# Patient Record
Sex: MALE | Race: WHITE | NOT HISPANIC OR LATINO
[De-identification: names, ages, dates, MRNs, and addresses within clinical notes are randomized per-mention and may not be internally consistent; named-entity substitution may affect disease eponyms.]

---

## 2018-01-26 ENCOUNTER — RECORD ABSTRACTING (OUTPATIENT)
Age: 48
End: 2018-01-26

## 2018-01-26 DIAGNOSIS — Z78.9 OTHER SPECIFIED HEALTH STATUS: ICD-10-CM

## 2018-01-26 DIAGNOSIS — Z82.49 FAMILY HISTORY OF ISCHEMIC HEART DISEASE AND OTHER DISEASES OF THE CIRCULATORY SYSTEM: ICD-10-CM

## 2018-01-26 PROBLEM — Z00.00 ENCOUNTER FOR PREVENTIVE HEALTH EXAMINATION: Status: ACTIVE | Noted: 2018-01-26

## 2018-02-22 ENCOUNTER — APPOINTMENT (OUTPATIENT)
Dept: CARDIOLOGY | Facility: CLINIC | Age: 48
End: 2018-02-22

## 2018-06-20 ENCOUNTER — RX RENEWAL (OUTPATIENT)
Age: 48
End: 2018-06-20

## 2018-06-20 ENCOUNTER — MEDICATION RENEWAL (OUTPATIENT)
Age: 48
End: 2018-06-20

## 2018-06-21 ENCOUNTER — RECORD ABSTRACTING (OUTPATIENT)
Age: 48
End: 2018-06-21

## 2018-06-21 RX ORDER — AMLODIPINE BESYLATE AND VALSARTAN 5; 160 MG/1; MG/1
5-160 TABLET, FILM COATED ORAL
Refills: 0 | Status: DISCONTINUED | COMMUNITY
End: 2018-06-21

## 2018-07-06 ENCOUNTER — APPOINTMENT (OUTPATIENT)
Dept: CARDIOLOGY | Facility: CLINIC | Age: 48
End: 2018-07-06
Payer: COMMERCIAL

## 2018-07-06 VITALS
RESPIRATION RATE: 16 BRPM | WEIGHT: 220 LBS | HEART RATE: 88 BPM | SYSTOLIC BLOOD PRESSURE: 120 MMHG | BODY MASS INDEX: 29.8 KG/M2 | DIASTOLIC BLOOD PRESSURE: 80 MMHG | HEIGHT: 72 IN

## 2018-07-06 PROCEDURE — 99214 OFFICE O/P EST MOD 30 MIN: CPT

## 2018-07-06 PROCEDURE — 93000 ELECTROCARDIOGRAM COMPLETE: CPT

## 2018-08-06 ENCOUNTER — APPOINTMENT (OUTPATIENT)
Dept: CARDIOLOGY | Facility: CLINIC | Age: 48
End: 2018-08-06
Payer: COMMERCIAL

## 2018-08-06 PROCEDURE — 93306 TTE W/DOPPLER COMPLETE: CPT

## 2018-08-13 ENCOUNTER — APPOINTMENT (OUTPATIENT)
Dept: ELECTROPHYSIOLOGY | Facility: CLINIC | Age: 48
End: 2018-08-13

## 2018-09-10 ENCOUNTER — APPOINTMENT (OUTPATIENT)
Dept: ELECTROPHYSIOLOGY | Facility: CLINIC | Age: 48
End: 2018-09-10

## 2018-09-14 ENCOUNTER — RX RENEWAL (OUTPATIENT)
Age: 48
End: 2018-09-14

## 2018-10-03 ENCOUNTER — APPOINTMENT (OUTPATIENT)
Dept: ELECTROPHYSIOLOGY | Facility: CLINIC | Age: 48
End: 2018-10-03
Payer: COMMERCIAL

## 2018-10-03 VITALS
BODY MASS INDEX: 29.8 KG/M2 | OXYGEN SATURATION: 98 % | HEIGHT: 72 IN | SYSTOLIC BLOOD PRESSURE: 124 MMHG | WEIGHT: 220 LBS | DIASTOLIC BLOOD PRESSURE: 82 MMHG | HEART RATE: 97 BPM

## 2018-10-03 VITALS — DIASTOLIC BLOOD PRESSURE: 79 MMHG | SYSTOLIC BLOOD PRESSURE: 116 MMHG

## 2018-10-03 PROCEDURE — 99245 OFF/OP CONSLTJ NEW/EST HI 55: CPT

## 2018-10-03 PROCEDURE — 93000 ELECTROCARDIOGRAM COMPLETE: CPT

## 2018-10-10 ENCOUNTER — OUTPATIENT (OUTPATIENT)
Dept: OUTPATIENT SERVICES | Facility: HOSPITAL | Age: 48
LOS: 1 days | End: 2018-10-10
Payer: COMMERCIAL

## 2018-10-10 VITALS
RESPIRATION RATE: 18 BRPM | SYSTOLIC BLOOD PRESSURE: 122 MMHG | HEIGHT: 72 IN | DIASTOLIC BLOOD PRESSURE: 92 MMHG | WEIGHT: 220.02 LBS | HEART RATE: 99 BPM | TEMPERATURE: 98 F

## 2018-10-10 VITALS — WEIGHT: 220.02 LBS

## 2018-10-10 DIAGNOSIS — Z98.890 OTHER SPECIFIED POSTPROCEDURAL STATES: Chronic | ICD-10-CM

## 2018-10-10 DIAGNOSIS — I48.1 PERSISTENT ATRIAL FIBRILLATION: ICD-10-CM

## 2018-10-10 DIAGNOSIS — Z01.810 ENCOUNTER FOR PREPROCEDURAL CARDIOVASCULAR EXAMINATION: ICD-10-CM

## 2018-10-10 LAB
ANION GAP SERPL CALC-SCNC: 13 MMOL/L — SIGNIFICANT CHANGE UP (ref 5–17)
APTT BLD: 37.2 SEC — SIGNIFICANT CHANGE UP (ref 27.5–37.4)
BASOPHILS # BLD AUTO: 0 K/UL — SIGNIFICANT CHANGE UP (ref 0–0.2)
BASOPHILS NFR BLD AUTO: 0.4 % — SIGNIFICANT CHANGE UP (ref 0–2)
BUN SERPL-MCNC: 15 MG/DL — SIGNIFICANT CHANGE UP (ref 8–20)
CALCIUM SERPL-MCNC: 9.5 MG/DL — SIGNIFICANT CHANGE UP (ref 8.6–10.2)
CHLORIDE SERPL-SCNC: 104 MMOL/L — SIGNIFICANT CHANGE UP (ref 98–107)
CO2 SERPL-SCNC: 23 MMOL/L — SIGNIFICANT CHANGE UP (ref 22–29)
CREAT SERPL-MCNC: 0.74 MG/DL — SIGNIFICANT CHANGE UP (ref 0.5–1.3)
EOSINOPHIL # BLD AUTO: 0.2 K/UL — SIGNIFICANT CHANGE UP (ref 0–0.5)
EOSINOPHIL NFR BLD AUTO: 3.9 % — SIGNIFICANT CHANGE UP (ref 0–6)
GLUCOSE SERPL-MCNC: 128 MG/DL — HIGH (ref 70–115)
HCT VFR BLD CALC: 43.9 % — SIGNIFICANT CHANGE UP (ref 42–52)
HCV AB S/CO SERPL IA: 0.37 S/CO — SIGNIFICANT CHANGE UP
HCV AB SERPL-IMP: SIGNIFICANT CHANGE UP
HGB BLD-MCNC: 15.3 G/DL — SIGNIFICANT CHANGE UP (ref 14–18)
HIV 1 & 2 AB SERPL IA.RAPID: SIGNIFICANT CHANGE UP
INR BLD: 1.56 RATIO — HIGH (ref 0.88–1.16)
LYMPHOCYTES # BLD AUTO: 0.9 K/UL — LOW (ref 1–4.8)
LYMPHOCYTES # BLD AUTO: 18 % — LOW (ref 20–55)
MAGNESIUM SERPL-MCNC: 2 MG/DL — SIGNIFICANT CHANGE UP (ref 1.6–2.6)
MCHC RBC-ENTMCNC: 31.7 PG — HIGH (ref 27–31)
MCHC RBC-ENTMCNC: 34.9 G/DL — SIGNIFICANT CHANGE UP (ref 32–36)
MCV RBC AUTO: 91.1 FL — SIGNIFICANT CHANGE UP (ref 80–94)
MONOCYTES # BLD AUTO: 0.6 K/UL — SIGNIFICANT CHANGE UP (ref 0–0.8)
MONOCYTES NFR BLD AUTO: 11.7 % — HIGH (ref 3–10)
NEUTROPHILS # BLD AUTO: 3.2 K/UL — SIGNIFICANT CHANGE UP (ref 1.8–8)
NEUTROPHILS NFR BLD AUTO: 65.8 % — SIGNIFICANT CHANGE UP (ref 37–73)
PLATELET # BLD AUTO: 205 K/UL — SIGNIFICANT CHANGE UP (ref 150–400)
POTASSIUM SERPL-MCNC: 4.2 MMOL/L — SIGNIFICANT CHANGE UP (ref 3.5–5.3)
POTASSIUM SERPL-SCNC: 4.2 MMOL/L — SIGNIFICANT CHANGE UP (ref 3.5–5.3)
PROTHROM AB SERPL-ACNC: 17.3 SEC — HIGH (ref 9.8–12.7)
RBC # BLD: 4.82 M/UL — SIGNIFICANT CHANGE UP (ref 4.6–6.2)
RBC # FLD: 12.4 % — SIGNIFICANT CHANGE UP (ref 11–15.6)
SODIUM SERPL-SCNC: 140 MMOL/L — SIGNIFICANT CHANGE UP (ref 135–145)
WBC # BLD: 4.9 K/UL — SIGNIFICANT CHANGE UP (ref 4.8–10.8)
WBC # FLD AUTO: 4.9 K/UL — SIGNIFICANT CHANGE UP (ref 4.8–10.8)

## 2018-10-10 PROCEDURE — 85610 PROTHROMBIN TIME: CPT

## 2018-10-10 PROCEDURE — 83735 ASSAY OF MAGNESIUM: CPT

## 2018-10-10 PROCEDURE — G0463: CPT

## 2018-10-10 PROCEDURE — 36415 COLL VENOUS BLD VENIPUNCTURE: CPT

## 2018-10-10 PROCEDURE — 86803 HEPATITIS C AB TEST: CPT

## 2018-10-10 PROCEDURE — 86703 HIV-1/HIV-2 1 RESULT ANTBDY: CPT

## 2018-10-10 PROCEDURE — 85730 THROMBOPLASTIN TIME PARTIAL: CPT

## 2018-10-10 PROCEDURE — 85027 COMPLETE CBC AUTOMATED: CPT

## 2018-10-10 PROCEDURE — 80048 BASIC METABOLIC PNL TOTAL CA: CPT

## 2018-10-10 PROCEDURE — 93010 ELECTROCARDIOGRAM REPORT: CPT

## 2018-10-10 PROCEDURE — 93005 ELECTROCARDIOGRAM TRACING: CPT

## 2018-10-10 NOTE — H&P PST ADULT - MUSCULOSKELETAL
detailed exam no calf tenderness/no joint erythema/no joint warmth/no joint swelling/normal/ROM intact

## 2018-10-10 NOTE — H&P PST ADULT - PMH
Atrial fibrillation, persistent    Cardiomyopathy  Ef 45%  ETOH abuse  stopped daily drinking of  5-6  drinks of rum and coke  april 2018,  now  only  drinks  wine  1  glass  4  times weekly as per pt, denies  history of DTs  Hyperlipidemia    Hypertension    LV dysfunction    Systolic left-sided congestive heart failure, NYHA class 1 Alcohol use  excessive  Atrial fibrillation, persistent    Cardiomyopathy  Ef 45%  EDMOND (dyspnea on exertion)    ETOH abuse  stopped daily drinking of  5-6  drinks of rum and coke  april 2018,  now  only  drinks  wine  1  glass  4  times weekly as per pt, denies  history of DTs  Hyperlipidemia    Hypertension    LV dysfunction    Palpitations    Systolic left-sided congestive heart failure, NYHA class 1 Alcohol use  excessive  Atrial fibrillation, persistent  CHADsVASC 2  Cardiomyopathy  Ef 45%  Dizziness    EDMOND (dyspnea on exertion)    ETOH abuse  stopped daily drinking of  5-6  drinks of rum and coke  april 2018,  now  only  drinks  wine  1  glass  4  times weekly as per pt, denies  history of DTs  Fatigue    History of cardioversion  2017 x3 months of SR  Hyperlipidemia    Hypertension    LV dysfunction    Palpitations    Systolic left-sided congestive heart failure, NYHA class 1

## 2018-10-10 NOTE — H&P PST ADULT - ASSESSMENT
KYLIE/CV for persistent and symptomatic AFib  Compliant with his medications  Pt to continue DOAC for KYLIE/CV  Escort for discharge  NPO after midnight

## 2018-10-10 NOTE — H&P PST ADULT - NEUROLOGICAL DETAILS
responds to verbal commands/deep reflexes intact/responds to pain/alert and oriented x 3/sensation intact

## 2018-10-10 NOTE — ASU PATIENT PROFILE, ADULT - PSH
S/P hernia repair  umbilical- H/O prior ablation treatment  cardiac ablation  for  af  april 2018  ssh   lasted  for  3  months  as per pt  S/P hernia repair  umbilical-

## 2018-10-10 NOTE — H&P PST ADULT - HISTORY OF PRESENT ILLNESS
56 y/o white male appears jittery during PST for KYLIE/CV for persistent and symptomatic atrial fibrillation which is impacting his quality of life as a  with SOB/fatigue  States compliance with medications ie; xarelto, BB and CCB/ARB    PMH: Prior CV in 2017 with recurrence in 3 months, NICM w/EF 45% ETOH likely induced CHADsVAsC score is 2 58 y/o white male appears jittery during PST for KYLIE/CV for persistent and symptomatic atrial fibrillation which is impacting his quality of life as a  with SOB/fatigue  States compliance with medications ie; xarelto, BB and CCB/ARB.  Patient is not receptive to antiarrythmic therapy at this time.    PMH: Prior CV in 2017 with recurrence in 3 months, NICM w/EF 45% ETOH likely induced CHADsVAsC score is 2

## 2018-10-10 NOTE — ASU PATIENT PROFILE, ADULT - PMH
Atrial fibrillation, persistent    Cardiomyopathy  Ef 30-30%  ETOH abuse    Hyperlipidemia    Hypertension    LV dysfunction Atrial fibrillation, persistent    Cardiomyopathy  Ef 30-30%  ETOH abuse  stopped daily drinking of  5-6  drinks of rum and coke  april 2018,  now  only  drinks  wine  1  glass  4  times weekly as per pt, denies  history of DTs  Hyperlipidemia    Hypertension    LV dysfunction

## 2018-10-11 PROBLEM — F10.10 ALCOHOL ABUSE, UNCOMPLICATED: Chronic | Status: ACTIVE | Noted: 2018-10-10

## 2018-10-14 ENCOUNTER — FORM ENCOUNTER (OUTPATIENT)
Age: 48
End: 2018-10-14

## 2018-10-15 ENCOUNTER — TRANSCRIPTION ENCOUNTER (OUTPATIENT)
Age: 48
End: 2018-10-15

## 2018-10-15 ENCOUNTER — OUTPATIENT (OUTPATIENT)
Dept: OUTPATIENT SERVICES | Facility: HOSPITAL | Age: 48
LOS: 1 days | End: 2018-10-15
Payer: COMMERCIAL

## 2018-10-15 VITALS — WEIGHT: 220.02 LBS

## 2018-10-15 DIAGNOSIS — I48.0 PAROXYSMAL ATRIAL FIBRILLATION: ICD-10-CM

## 2018-10-15 DIAGNOSIS — Z98.890 OTHER SPECIFIED POSTPROCEDURAL STATES: Chronic | ICD-10-CM

## 2018-10-15 DIAGNOSIS — Z01.810 ENCOUNTER FOR PREPROCEDURAL CARDIOVASCULAR EXAMINATION: ICD-10-CM

## 2018-10-15 PROCEDURE — 93005 ELECTROCARDIOGRAM TRACING: CPT

## 2018-10-15 PROCEDURE — 93312 ECHO TRANSESOPHAGEAL: CPT

## 2018-10-15 PROCEDURE — 93320 DOPPLER ECHO COMPLETE: CPT | Mod: 26

## 2018-10-15 PROCEDURE — 93312 ECHO TRANSESOPHAGEAL: CPT | Mod: 26

## 2018-10-15 PROCEDURE — 92960 CARDIOVERSION ELECTRIC EXT: CPT

## 2018-10-15 PROCEDURE — 93325 DOPPLER ECHO COLOR FLOW MAPG: CPT

## 2018-10-15 PROCEDURE — 93010 ELECTROCARDIOGRAM REPORT: CPT

## 2018-10-15 PROCEDURE — 93320 DOPPLER ECHO COMPLETE: CPT

## 2018-10-15 PROCEDURE — 93325 DOPPLER ECHO COLOR FLOW MAPG: CPT | Mod: 26

## 2018-10-15 PROCEDURE — 90686 IIV4 VACC NO PRSV 0.5 ML IM: CPT

## 2018-10-15 RX ORDER — SODIUM CHLORIDE 9 MG/ML
1000 INJECTION INTRAMUSCULAR; INTRAVENOUS; SUBCUTANEOUS
Qty: 0 | Refills: 0 | Status: DISCONTINUED | OUTPATIENT
Start: 2018-10-15 | End: 2018-10-30

## 2018-10-15 RX ORDER — INFLUENZA VIRUS VACCINE 15; 15; 15; 15 UG/.5ML; UG/.5ML; UG/.5ML; UG/.5ML
0.5 SUSPENSION INTRAMUSCULAR ONCE
Qty: 0 | Refills: 0 | Status: COMPLETED | OUTPATIENT
Start: 2018-10-15 | End: 2018-10-15

## 2018-10-15 RX ADMIN — SODIUM CHLORIDE 30 MILLILITER(S): 9 INJECTION INTRAMUSCULAR; INTRAVENOUS; SUBCUTANEOUS at 08:55

## 2018-10-15 RX ADMIN — INFLUENZA VIRUS VACCINE 0.5 MILLILITER(S): 15; 15; 15; 15 SUSPENSION INTRAMUSCULAR at 11:15

## 2018-10-15 NOTE — PROGRESS NOTE ADULT - SUBJECTIVE AND OBJECTIVE BOX
NPO>8 hours  IVF ordered  Took Xarelto at home today  Dr. Arreola to consent NPO>8 hours  IVF ordered  Took Xarelto at home today  AFib on tele  Dr. Arreola to consent

## 2018-10-15 NOTE — DISCHARGE NOTE ADULT - NS AS ACTIVITY OBS
Walking-Outdoors allowed/Do not make important decisions/Walking-Indoors allowed/Bathing allowed/Showering allowed/Do not drive or operate machinery

## 2018-10-15 NOTE — DISCHARGE NOTE ADULT - CARE PLAN
Principal Discharge DX:	Atrial fibrillation, persistent  Goal:	ADL without palpitations  Assessment and plan of treatment:	Continue xarelto and other meds  See cardiology 7-10 days as outpatient

## 2018-10-15 NOTE — DISCHARGE NOTE ADULT - CARE PROVIDER_API CALL
Rhett Arreola), Cardiovascular Disease; Internal Medicine  [address] [place] [address]  Phone: [phone]  Fax: [phone]

## 2018-10-15 NOTE — PROCEDURE NOTE - ADDITIONAL PROCEDURE DETAILS
Pre-procedural KYLIE performed confirming absence of LA/ROCIO thrombus. Patient also on full A/C with Xarelto taken daily and morning of procedure.

## 2018-10-15 NOTE — PROGRESS NOTE ADULT - SUBJECTIVE AND OBJECTIVE BOX
FIDELINA LOWRY  [id number]    After risks, benefits and alternatives of the procedure were explained, consent was signded and placed in the medical record.  Procedural timeout was taken.  Sedation was administered by anesthesia.  KYLIE probe inserted without complication and KYLIE performed.   Patient tolerated the procedure well without complication.  See full report for findings.     1. Moderate LV dysfunction  2. Moderate LAE  3. Mild MR  4. NO evidence LA/ROCIO thrombus  5. Intact IAS    Plan:  1: After KYLIE patient successfully cardioverted to SR (see note)  2. DC home today  3. Outpatient follow up with Sydnee Doyle and Logan, consider antiarrhythmic therapy if patient amenable      Rhett Arreola MD

## 2018-10-15 NOTE — DISCHARGE NOTE ADULT - PATIENT PORTAL LINK FT
You can access the [facility] Patient Portal, offered by [facility], by registering with the following website: [email or web address]

## 2018-10-15 NOTE — DISCHARGE NOTE ADULT - MEDICATION SUMMARY - MEDICATIONS TO TAKE
I will START or STAY ON the medications listed below when I get home from the hospital:    Xarelto 20 mg oral tablet  -- 1 tab(s) by mouth once a day (in the evening)  -- Indication: For Paroxysmal atrial fibrillation    amLODIPine-valsartan 5 mg-160 mg oral tablet  -- 1 tab(s) by mouth once a day  -- Indication: For htn    Metoprolol Succinate  mg oral tablet, extended release  -- 1 tab(s) by mouth once a day  -- Indication: For Paroxysmal atrial fibrillation

## 2018-10-15 NOTE — DISCHARGE NOTE ADULT - HOSPITAL COURSE
58 y/o white male appears jittery during PST for KYLIE/CV for persistent and symptomatic atrial fibrillation which is impacting his quality of life as a  with SOB/fatigue  States compliance with medications ie; xarelto, BB and CCB/ARB.  Patient is not receptive to antiarrythmic therapy at this time.    PMH: Prior CV in 2017 with recurrence in 3 months, NICM w/EF 45% ETOH likely induced CHADsVAsC score is 2  S/P KYLIE no clott  DCCV 200 joules 1st attempt  Continue xarelto as ordered.

## 2018-11-02 ENCOUNTER — APPOINTMENT (OUTPATIENT)
Dept: CARDIOLOGY | Facility: CLINIC | Age: 48
End: 2018-11-02
Payer: COMMERCIAL

## 2018-11-02 VITALS
HEART RATE: 61 BPM | HEIGHT: 72 IN | WEIGHT: 218 LBS | SYSTOLIC BLOOD PRESSURE: 140 MMHG | RESPIRATION RATE: 15 BRPM | DIASTOLIC BLOOD PRESSURE: 90 MMHG | BODY MASS INDEX: 29.53 KG/M2

## 2018-11-02 PROBLEM — I50.20 UNSPECIFIED SYSTOLIC (CONGESTIVE) HEART FAILURE: Chronic | Status: ACTIVE | Noted: 2018-10-10

## 2018-11-02 PROBLEM — R06.09 OTHER FORMS OF DYSPNEA: Chronic | Status: ACTIVE | Noted: 2018-10-10

## 2018-11-02 PROBLEM — I51.9 HEART DISEASE, UNSPECIFIED: Chronic | Status: ACTIVE | Noted: 2018-10-10

## 2018-11-02 PROBLEM — R53.83 OTHER FATIGUE: Chronic | Status: ACTIVE | Noted: 2018-10-10

## 2018-11-02 PROBLEM — R42 DIZZINESS AND GIDDINESS: Chronic | Status: ACTIVE | Noted: 2018-10-10

## 2018-11-02 PROBLEM — R00.2 PALPITATIONS: Chronic | Status: ACTIVE | Noted: 2018-10-10

## 2018-11-02 PROBLEM — Z78.9 OTHER SPECIFIED HEALTH STATUS: Chronic | Status: ACTIVE | Noted: 2018-10-10

## 2018-11-02 PROCEDURE — 93000 ELECTROCARDIOGRAM COMPLETE: CPT

## 2018-11-02 PROCEDURE — 99214 OFFICE O/P EST MOD 30 MIN: CPT

## 2018-11-16 ENCOUNTER — RX RENEWAL (OUTPATIENT)
Age: 48
End: 2018-11-16

## 2019-01-23 ENCOUNTER — APPOINTMENT (OUTPATIENT)
Dept: CARDIOLOGY | Facility: CLINIC | Age: 49
End: 2019-01-23
Payer: COMMERCIAL

## 2019-01-23 PROCEDURE — 93306 TTE W/DOPPLER COMPLETE: CPT

## 2019-02-08 ENCOUNTER — NON-APPOINTMENT (OUTPATIENT)
Age: 49
End: 2019-02-08

## 2019-02-08 ENCOUNTER — APPOINTMENT (OUTPATIENT)
Dept: CARDIOLOGY | Facility: CLINIC | Age: 49
End: 2019-02-08
Payer: COMMERCIAL

## 2019-02-08 VITALS
SYSTOLIC BLOOD PRESSURE: 140 MMHG | DIASTOLIC BLOOD PRESSURE: 90 MMHG | BODY MASS INDEX: 29.8 KG/M2 | HEIGHT: 72 IN | HEART RATE: 56 BPM | RESPIRATION RATE: 14 BRPM | WEIGHT: 220 LBS

## 2019-02-08 PROCEDURE — 93000 ELECTROCARDIOGRAM COMPLETE: CPT

## 2019-02-08 PROCEDURE — 99214 OFFICE O/P EST MOD 30 MIN: CPT

## 2019-02-08 NOTE — REASON FOR VISIT
[Follow-Up - Clinic] : a clinic follow-up of [FreeTextEntry1] : The patient is a 48-year-old white male with a known history of idiopathic cardiomyopathy (possibly alcohol-related) who also has a history of paroxysmal atrial  fibrillation;\par \par He presents back to the office today for general cardiac checkup;\par \par Fortunately, he has been feeling well without any significant symptoms of chest pain, shortness of breath, palpitations, dizziness or syncope;

## 2019-02-08 NOTE — HISTORY OF PRESENT ILLNESS
[FreeTextEntry1] : He is working full time in his plumbing business and remains physically active.;\par \par He reports that he has cut down considerably on alcohol etc.;\par \par He had a recent echocardiogram  and is here to discus those results;

## 2019-02-08 NOTE — PHYSICAL EXAM
[General Appearance - Well Developed] : well developed [Normal Appearance] : normal appearance [Well Groomed] : well groomed [General Appearance - Well Nourished] : well nourished [No Deformities] : no deformities [Normal Conjunctiva] : the conjunctiva exhibited no abnormalities [Eyelids - No Xanthelasma] : the eyelids demonstrated no xanthelasmas [Normal Oral Mucosa] : normal oral mucosa [No Oral Pallor] : no oral pallor [No Oral Cyanosis] : no oral cyanosis [Normal Jugular Venous A Waves Present] : normal jugular venous A waves present [Normal Jugular Venous V Waves Present] : normal jugular venous V waves present [No Jugular Venous Crump A Waves] : no jugular venous crump A waves [Respiration, Rhythm And Depth] : normal respiratory rhythm and effort [Exaggerated Use Of Accessory Muscles For Inspiration] : no accessory muscle use [Auscultation Breath Sounds / Voice Sounds] : lungs were clear to auscultation bilaterally [Heart Sounds] : normal S1 and S2 [Murmurs] : no murmurs present [FreeTextEntry1] : regular rhythm with moderate rate, normal S1-S2; [Abdomen Soft] : soft [Abdomen Tenderness] : non-tender [] : no hepato-splenomegaly [Abdomen Mass (___ Cm)] : no abdominal mass palpated

## 2019-02-08 NOTE — ASSESSMENT
[FreeTextEntry1] : EKG shows sinus bradycardia at a rate of 56 with poor R-wave progression V1 to V2 and otherwise no acute changes;\par \par in summary, the patient is a 48-year-old  history of paroxysmal AF, and idiopathic cardiomyopathy disease possibly EtOH related  who presents back  for general cardiac checkup today;\par \par \par Transthoracic echocardiogram still showing left ventricular enlargement with mild to moderate LV dysfunction (LVEF of 40-45% range with normal LA size;\par \par Plan:\par \par Patient recommended to continue current multiple medical regimens including anticoagulation with Xarelto;\par \par Patient had conferred with Dr. Doyle from the EP standpoint in the past;\par \par Followup to this office within 5 months or p.r.n.\par \par No additional cardiac workup indicated at this time;

## 2019-06-07 ENCOUNTER — APPOINTMENT (OUTPATIENT)
Dept: CARDIOLOGY | Facility: CLINIC | Age: 49
End: 2019-06-07
Payer: COMMERCIAL

## 2019-06-07 ENCOUNTER — NON-APPOINTMENT (OUTPATIENT)
Age: 49
End: 2019-06-07

## 2019-06-07 VITALS
BODY MASS INDEX: 30.75 KG/M2 | WEIGHT: 232 LBS | DIASTOLIC BLOOD PRESSURE: 82 MMHG | RESPIRATION RATE: 14 BRPM | HEART RATE: 65 BPM | SYSTOLIC BLOOD PRESSURE: 127 MMHG | HEIGHT: 73 IN

## 2019-06-07 PROCEDURE — 99214 OFFICE O/P EST MOD 30 MIN: CPT

## 2019-06-07 PROCEDURE — 93000 ELECTROCARDIOGRAM COMPLETE: CPT

## 2019-06-07 NOTE — REASON FOR VISIT
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

## 2019-06-07 NOTE — PHYSICAL EXAM
[General Appearance - Well Developed] : well developed [Normal Appearance] : normal appearance [Well Groomed] : well groomed [General Appearance - Well Nourished] : well nourished [No Deformities] : no deformities [Normal Conjunctiva] : the conjunctiva exhibited no abnormalities [Normal Oral Mucosa] : normal oral mucosa [Eyelids - No Xanthelasma] : the eyelids demonstrated no xanthelasmas [No Oral Pallor] : no oral pallor [No Oral Cyanosis] : no oral cyanosis [Normal Jugular Venous A Waves Present] : normal jugular venous A waves present [Normal Jugular Venous V Waves Present] : normal jugular venous V waves present [No Jugular Venous Crump A Waves] : no jugular venous crump A waves [Respiration, Rhythm And Depth] : normal respiratory rhythm and effort [Exaggerated Use Of Accessory Muscles For Inspiration] : no accessory muscle use [Auscultation Breath Sounds / Voice Sounds] : lungs were clear to auscultation bilaterally [Murmurs] : no murmurs present [Heart Sounds] : normal S1 and S2 [Abdomen Soft] : soft [Abdomen Tenderness] : non-tender [Abdomen Mass (___ Cm)] : no abdominal mass palpated [Abnormal Walk] : normal gait [Nail Clubbing] : no clubbing of the fingernails [Gait - Sufficient For Exercise Testing] : the gait was sufficient for exercise testing [Cyanosis, Localized] : no localized cyanosis [Petechial Hemorrhages (___cm)] : no petechial hemorrhages [FreeTextEntry1] : Well tanned sunburn [] : no ischemic changes [Affect] : the affect was normal [Oriented To Time, Place, And Person] : oriented to person, place, and time [Mood] : the mood was normal [No Anxiety] : not feeling anxious

## 2019-06-07 NOTE — ASSESSMENT
[FreeTextEntry1] : EKG shows normal sinus rhythm at a rate of 62 with some late R wave transition V1 to V3 and otherwise no acute changes\par \par Last transthoracic echo showed mild LV enlargement with mild LV dysfunction with an EF in the range of 40-45%;\par \par In summary the patient is a 48-year-old gentleman with a history of cardiomyopathy with mild LV dysfunction which seems to have somewhat improved on multiple medical regimens and cutting back on alcohol as requested in the past.;\par \par Plan:\par \par Recommend continue current medical regimen, patient to report any untoward shortness of breath lightheadedness or dizziness;\par \par Again reminded to curtail EtOH use this summer, maintaining good p.o. hydration, and continue present medical regimen;\par \par Followup in this office within 4 months or p.r.n.;\par \par No additional cardiac workup indicated at this time;

## 2019-06-07 NOTE — HISTORY OF PRESENT ILLNESS
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

## 2019-07-12 ENCOUNTER — RX RENEWAL (OUTPATIENT)
Age: 49
End: 2019-07-12

## 2019-09-06 ENCOUNTER — RX RENEWAL (OUTPATIENT)
Age: 49
End: 2019-09-06

## 2019-11-05 ENCOUNTER — RX RENEWAL (OUTPATIENT)
Age: 49
End: 2019-11-05

## 2020-01-21 ENCOUNTER — NON-APPOINTMENT (OUTPATIENT)
Age: 50
End: 2020-01-21

## 2020-01-21 ENCOUNTER — APPOINTMENT (OUTPATIENT)
Dept: CARDIOLOGY | Facility: CLINIC | Age: 50
End: 2020-01-21
Payer: COMMERCIAL

## 2020-01-21 VITALS
DIASTOLIC BLOOD PRESSURE: 90 MMHG | SYSTOLIC BLOOD PRESSURE: 140 MMHG | WEIGHT: 236 LBS | OXYGEN SATURATION: 98 % | HEART RATE: 93 BPM | HEIGHT: 73 IN | BODY MASS INDEX: 31.28 KG/M2 | RESPIRATION RATE: 16 BRPM

## 2020-01-21 PROCEDURE — 99215 OFFICE O/P EST HI 40 MIN: CPT

## 2020-01-21 PROCEDURE — 93000 ELECTROCARDIOGRAM COMPLETE: CPT

## 2020-01-21 NOTE — PHYSICAL EXAM
[Normal Appearance] : normal appearance [General Appearance - In No Acute Distress] : no acute distress [FreeTextEntry1] : Obese [Normal Conjunctiva] : the conjunctiva exhibited no abnormalities [Normal Oral Mucosa] : normal oral mucosa [Normal Oropharynx] : normal oropharynx [Normal Jugular Venous A Waves Present] : normal jugular venous A waves present [Normal Jugular Venous V Waves Present] : normal jugular venous V waves present [No Jugular Venous Crump A Waves] : no jugular venous crump A waves [] : no respiratory distress [Auscultation Breath Sounds / Voice Sounds] : lungs were clear to auscultation bilaterally [Respiration, Rhythm And Depth] : normal respiratory rhythm and effort [Heart Rate And Rhythm] : heart rate and rhythm were normal [Murmurs] : no murmurs present [Heart Sounds] : normal S1 and S2 [Bowel Sounds] : normal bowel sounds [Edema] : no peripheral edema present [Abnormal Walk] : normal gait [Cyanosis, Localized] : no localized cyanosis [Nail Clubbing] : no clubbing of the fingernails [Skin Color & Pigmentation] : normal skin color and pigmentation [Skin Turgor] : normal skin turgor [Impaired Insight] : insight and judgment were intact [Oriented To Time, Place, And Person] : oriented to person, place, and time [Affect] : the affect was normal

## 2020-01-21 NOTE — ASSESSMENT
[FreeTextEntry1] : EKG 1/21/2020:  The EKG illustrates atrial fibrillation with elevated rates in 90's, poor R wave progression V1 to V2. \par \par Most recent Exercise Gated Nuclear Stress test (9/12/2017):  Patient exercised 10 METS and developed shortness of breath that resolved with rest.  The patient developed frequent PVC's and occasional PACs during exercise.  The myocardial perfusion imaging was negative for ischemia with artifact.  The LV function was preserved with an EF of 54%.

## 2020-01-21 NOTE — DISCUSSION/SUMMARY
[FreeTextEntry1] : 1).  Patient is currently in atrial fibrillation with elevated rates in the 90's despite compliance with taking his beta blocker daily;  He is to take an additional 1/2 tablet of Metoprolol tonight (100 mg) for total of (300 mg) and return to his usual dosage of 200 mg QD.\par \par He will complete a 24 Hour Holter monitor to assess for a-fib burden with any associated tachy / avery arrhythmias, pauses and/or AV blocks.\par \par 2).  Patient is tolerating cardiac medications without negative side effects, continue with current cardiac medication regimen including beta blocker therapy as directed and anticoagulation for thromboembolic prophylaxis. \par \par 3).  Patient is to follow up with Electrophysiologist (Dr. Sam / Dr. Case) regarding evaluation and management of a-fib;  discuss the need for possible ILR loop recorder as well as discuss therapies such as medication vs. cardioversion vs. ablation.\par \par 4).  He is to complete a Transthoracic Echocardiogram in the near future to assess cardiac function, cardiomyopathy and valvulopathy.\par \par 5).  Counseled patient on the importance of decreasing ETOH and caffeine intake in hopes of minimizing a-fib with RVR.  He is to maintain optimal PO hydration.\par \par 6).  Immediately go to the emergency department and/or report any untoward symptoms to our office including worsening palpitations, shortness of breath, dizziness, chest pain.\par \par 7).  Follow up with our office in 4 to 6 weeks or PRN.

## 2020-01-21 NOTE — REASON FOR VISIT
[FreeTextEntry1] : The patient is a 49-year-old gentleman with known history of idiopathic cardiomyopathy (possibly alcohol-related) was a history of LV dysfunction and paroxysmal atrial fibrillation as well.  Mr. Burns reports experiencing intermittent episodes of palpitations approximately every two weeks, lasting only seconds, resolve spontaneously.  He denies any associated symptoms of SOB, presyncope, syncope, CP, PND, orthopnea, edema.\par \par

## 2020-01-21 NOTE — HISTORY OF PRESENT ILLNESS
[FreeTextEntry1] : He admits to still drinking ETOH occasionally and also consumes caffeinated beverages daily.  He apparently does not keep well hydrated either.\par \par Patient is tolerating cardiac medications without negative side effects including Metoprolol Succinate 200 mg QD, Xarelto 20 mg QD, Amlodipine-Valsartan 5-160 mg QD.\par \par Last transthoracic echo (January 2019) showed mild LV enlargement with mild LV dysfunction with an EF in the range of 40-45%.

## 2020-02-06 ENCOUNTER — APPOINTMENT (OUTPATIENT)
Dept: CARDIOLOGY | Facility: CLINIC | Age: 50
End: 2020-02-06

## 2020-02-28 ENCOUNTER — APPOINTMENT (OUTPATIENT)
Dept: CARDIOLOGY | Facility: CLINIC | Age: 50
End: 2020-02-28

## 2020-03-02 ENCOUNTER — APPOINTMENT (OUTPATIENT)
Dept: CARDIOLOGY | Facility: CLINIC | Age: 50
End: 2020-03-02
Payer: COMMERCIAL

## 2020-03-02 PROCEDURE — 93306 TTE W/DOPPLER COMPLETE: CPT

## 2020-03-06 ENCOUNTER — APPOINTMENT (OUTPATIENT)
Dept: CARDIOLOGY | Facility: CLINIC | Age: 50
End: 2020-03-06
Payer: COMMERCIAL

## 2020-03-06 ENCOUNTER — NON-APPOINTMENT (OUTPATIENT)
Age: 50
End: 2020-03-06

## 2020-03-06 VITALS
DIASTOLIC BLOOD PRESSURE: 86 MMHG | WEIGHT: 235 LBS | BODY MASS INDEX: 31.83 KG/M2 | HEART RATE: 114 BPM | SYSTOLIC BLOOD PRESSURE: 120 MMHG | HEIGHT: 72 IN | RESPIRATION RATE: 16 BRPM

## 2020-03-06 PROCEDURE — 93000 ELECTROCARDIOGRAM COMPLETE: CPT

## 2020-03-06 PROCEDURE — 99214 OFFICE O/P EST MOD 30 MIN: CPT

## 2020-03-06 NOTE — DISCUSSION/SUMMARY
[FreeTextEntry1] : 1).  A-Fib burden and ventricular rate elevated on recent holter monitor;  will augment patient's beta blocker by increasing to Metoprolol Succinate 300 mg daily.  \par \par All other previously prescribed cardiac medications will be continued including anticoagulation for thromboembolic prophylaxis for a-fib. \par \par 2).  He is to follow up with EP consult with (Dr. Case) on March 26th regarding evaluation and management of a-fib;  discuss need for repeat cardioversion vs. ablation vs. medication.\par \par 3).  Once again counseled patient on the importance of decreasing ETOH and caffeine intake in hopes of minimizing a-fib with RVR. He is to maintain optimal PO hydration.\par \par 4).  Recommend patient report any untoward symptoms. \par \par 5).  Follow up with our office in 3 to 4 months or PRN.

## 2020-03-06 NOTE — REASON FOR VISIT
[FreeTextEntry1] : The patient is a 49-year-old gentleman with known history of idiopathic cardiomyopathy (possibly alcohol-related) was a history of LV dysfunction and paroxysmal atrial fibrillation as well.  \par \par Mr. Burns is also here to review the most recent results of 24 Hour Holter monitor and Transthoracic Echocardiogram.  He reports the previously experienced episodes of palpitations have since decreased in frequency although still present periodically. \par \par He denies any associated symptoms of SOB, presyncope, syncope, CP, PND, orthopnea, edema.\par \par

## 2020-03-06 NOTE — PHYSICAL EXAM
[Normal Appearance] : normal appearance [General Appearance - In No Acute Distress] : no acute distress [Normal Conjunctiva] : the conjunctiva exhibited no abnormalities [Normal Oral Mucosa] : normal oral mucosa [Normal Oropharynx] : normal oropharynx [Normal Jugular Venous A Waves Present] : normal jugular venous A waves present [Normal Jugular Venous V Waves Present] : normal jugular venous V waves present [No Jugular Venous Crump A Waves] : no jugular venous crump A waves [] : no respiratory distress [Respiration, Rhythm And Depth] : normal respiratory rhythm and effort [Auscultation Breath Sounds / Voice Sounds] : lungs were clear to auscultation bilaterally [Heart Rate And Rhythm] : heart rate and rhythm were normal [Heart Sounds] : normal S1 and S2 [Murmurs] : no murmurs present [Edema] : no peripheral edema present [Bowel Sounds] : normal bowel sounds [Abnormal Walk] : normal gait [Nail Clubbing] : no clubbing of the fingernails [Cyanosis, Localized] : no localized cyanosis [Skin Color & Pigmentation] : normal skin color and pigmentation [Skin Turgor] : normal skin turgor [Oriented To Time, Place, And Person] : oriented to person, place, and time [Impaired Insight] : insight and judgment were intact [Affect] : the affect was normal [FreeTextEntry1] : Obese

## 2020-03-06 NOTE — ASSESSMENT
[FreeTextEntry1] : EKG 3/6/2020:  The EKG illustrates atrial fibrillation with elevated rate at 114 bpm,  poor R wave progression V1 to V3. \par \par Most recent Exercise Gated Nuclear Stress test (9/12/2017): Patient exercised 10 METS and developed shortness of breath that resolved with rest. The patient developed frequent PVC's and occasional PACs during exercise. The myocardial perfusion imaging was negative for ischemia with artifact. The LV function was preserved with an EF of 54%.

## 2020-03-06 NOTE — HISTORY OF PRESENT ILLNESS
[FreeTextEntry1] : He admits to still drinking ETOH occasionally and also consumes caffeinated beverages daily. He has increased his water intake and keeping better PO hydrated.\par \par Patient is tolerating cardiac medications without negative side effects including Metoprolol Succinate 200 mg QD, Xarelto 20 mg QD, Amlodipine-Valsartan 5-160 mg QD.\par \par 24 Hour Holter Monitor (1/21/2020):  Presenting rhythm is atrial fibrillation with average ventricular rate at 93 bpm (Max 176, Min 62).  There were NO episodes of VT, pauses or blocks.  There were 14 multifocal PVCs recorded.  There was ONE patient event recorded correlating with atrial fibrillation.\par \par Transthoracic Echocardiogram (3/2/2020):  LV function unchanged from previous study with an LVEF of 40 to 45%.  Mild to moderately increased left ventricular internal cavity size (unchanged from previous study).  The left and right atria normal in size and structure.  Trace MR.

## 2020-03-26 ENCOUNTER — APPOINTMENT (OUTPATIENT)
Dept: ELECTROPHYSIOLOGY | Facility: CLINIC | Age: 50
End: 2020-03-26

## 2020-04-13 ENCOUNTER — RX RENEWAL (OUTPATIENT)
Age: 50
End: 2020-04-13

## 2020-08-06 ENCOUNTER — APPOINTMENT (OUTPATIENT)
Dept: ELECTROPHYSIOLOGY | Facility: CLINIC | Age: 50
End: 2020-08-06
Payer: COMMERCIAL

## 2020-08-06 ENCOUNTER — NON-APPOINTMENT (OUTPATIENT)
Age: 50
End: 2020-08-06

## 2020-08-06 VITALS
RESPIRATION RATE: 16 BRPM | BODY MASS INDEX: 33.18 KG/M2 | DIASTOLIC BLOOD PRESSURE: 80 MMHG | WEIGHT: 245 LBS | SYSTOLIC BLOOD PRESSURE: 128 MMHG | TEMPERATURE: 97.8 F | HEART RATE: 84 BPM | HEIGHT: 72 IN

## 2020-08-06 DIAGNOSIS — I48.0 PAROXYSMAL ATRIAL FIBRILLATION: ICD-10-CM

## 2020-08-06 PROCEDURE — 99245 OFF/OP CONSLTJ NEW/EST HI 55: CPT | Mod: 25

## 2020-08-06 PROCEDURE — 93000 ELECTROCARDIOGRAM COMPLETE: CPT | Mod: 59

## 2020-08-06 NOTE — HISTORY OF PRESENT ILLNESS
[FreeTextEntry1] : FIDELINA LOWRY is a 49 year old male with non-ischemic cardiomyopathy and HFmrEF (LVEF: 40-45%) and hypertension who is referred in consultation by Dr. Ford for persistent atrial fibrillation.\par \par To summarize his history, in 2017 the patient presented to his PCP with complaints of dyspnea on exertion and not feeling right. ECG demonstrated AF. He was referred to Dr. Ford. Exercise stress test was performed which was negative for ischemia. He had a cardioversion in 2017 but had recurrence of AF 3 months later. He saw Dr. Doyle in 10/2018 for persistent AF. His EF was noted to be 45% around that time. He underwent repeat KYLIE/CV with Dr. Doyle in 10/2018 and on KYLIE he was noted to have a low EF of 35%. His low EF was suspected to be secondary to EtOH use. Since then he has cut down on EtOH use but still drinks occasionally. He maintained NSR until 1/2020 at which time during follow up he was noted to be in AF.\par \par When in atrial fibrillation he reports having dyspnea on exertion and not having the same exercise tolerance. He denies any fevers, chills, cough, sweats, chest pain, palpitations, orthopnea, paroxysmal nocturnal dyspnea, peripheral edema, lightheadedness, dizziness, syncope, nausea, vomiting, melena, hematochezia, or hematemesis.

## 2020-08-06 NOTE — REASON FOR VISIT
[Consultation] : a consultation regarding [Atrial Fibrillation] : atrial fibrillation [Cardiomyopathy] : cardiomyopathy [FreeTextEntry1] : Cardiologist: Dr. Ford

## 2020-08-06 NOTE — DISCUSSION/SUMMARY
[FreeTextEntry1] : FIDELINA LOWRY is a 49 year old male with non-ischemic cardiomyopathy and HFmrEF (LVEF: 40-45%) and hypertension who is referred in consultation by Dr. Ford for persistent atrial fibrillation.\par \par We had a lengthy discussion about the diagnosis of atrial fibrillation, its prognosis and options for management (rate vs rhythm control), and the advantages and disadvantages of each. We also discussed the options for rhythm control with either medications, catheter ablation, or both.\par \par The risks of catheter ablation were described in detail and include, but are not limited to: pain, bleeding, infection, vascular injury, cardiac perforation and tamponade with potential for requiring open heart surgery, complete heart block requiring permanent pacing, phrenic nerve injury and diaphragmatic paralysis, atrioesophageal fistula, pulmonary vein stenosis, radiation-induced injury, death, heart attack, or stroke, of which the overall rates of occurrence range from 0.1-4%. The patient expressed understanding and was provided the opportunity to ask questions, of which all were answered to the patient's satisfaction.\par \par Considering he has a low EF and has symptomatic AF despite rate control, I recommended restoration of normal rhythm. He would like to proceed with AF ablation.\par \par In regards to anticoagulation, their CHADS2-VASc score is 2 (CHF, HTN) and so should be continued on systemic anticoagulation with Rivaroxaban.\par \par I also counseled the patient to cut down on EtOH intake as this can worsen AF symptoms.\par \par Recommendations:\par - Catheter ablation of AF\par - KYLIE before ablation to rule out LA/ROCIO thrombus\par - Continue Rivaroxaban uninterrupted, hold morning of procedure

## 2020-08-06 NOTE — PHYSICAL EXAM
[General Appearance - Well Developed] : well developed [Normal Appearance] : normal appearance [General Appearance - Well Nourished] : well nourished [Normal Conjunctiva] : the conjunctiva exhibited no abnormalities [Normal Jugular Venous V Waves Present] : normal jugular venous V waves present [Heart Sounds] : normal S1 and S2 [Murmurs] : no murmurs present [Edema] : no peripheral edema present [Respiration, Rhythm And Depth] : normal respiratory rhythm and effort [Exaggerated Use Of Accessory Muscles For Inspiration] : no accessory muscle use [Auscultation Breath Sounds / Voice Sounds] : lungs were clear to auscultation bilaterally [Bowel Sounds] : normal bowel sounds [Abdomen Soft] : soft [Abdomen Tenderness] : non-tender [Abnormal Walk] : normal gait [Nail Clubbing] : no clubbing of the fingernails [Cyanosis, Localized] : no localized cyanosis [Skin Color & Pigmentation] : normal skin color and pigmentation [Skin Turgor] : normal skin turgor [] : no rash [Oriented To Time, Place, And Person] : oriented to person, place, and time [Impaired Insight] : insight and judgment were intact [No Anxiety] : not feeling anxious [FreeTextEntry1] : Irregularly irregular.

## 2020-09-16 ENCOUNTER — NON-APPOINTMENT (OUTPATIENT)
Age: 50
End: 2020-09-16

## 2020-09-29 DIAGNOSIS — Z01.818 ENCOUNTER FOR OTHER PREPROCEDURAL EXAMINATION: ICD-10-CM

## 2020-09-30 ENCOUNTER — APPOINTMENT (OUTPATIENT)
Dept: DISASTER EMERGENCY | Facility: CLINIC | Age: 50
End: 2020-09-30

## 2020-09-30 LAB — SARS-COV-2 N GENE NPH QL NAA+PROBE: NOT DETECTED

## 2020-10-01 ENCOUNTER — FORM ENCOUNTER (OUTPATIENT)
Age: 50
End: 2020-10-01

## 2020-10-02 ENCOUNTER — TRANSCRIPTION ENCOUNTER (OUTPATIENT)
Age: 50
End: 2020-10-02

## 2020-10-02 ENCOUNTER — INPATIENT (INPATIENT)
Facility: HOSPITAL | Age: 50
LOS: 0 days | Discharge: HOME | DRG: 274 | End: 2020-10-03
Attending: INTERNAL MEDICINE | Admitting: INTERNAL MEDICINE
Payer: COMMERCIAL

## 2020-10-02 VITALS
DIASTOLIC BLOOD PRESSURE: 90 MMHG | OXYGEN SATURATION: 98 % | RESPIRATION RATE: 16 BRPM | HEART RATE: 98 BPM | TEMPERATURE: 98 F | SYSTOLIC BLOOD PRESSURE: 127 MMHG

## 2020-10-02 DIAGNOSIS — I48.0 PAROXYSMAL ATRIAL FIBRILLATION: ICD-10-CM

## 2020-10-02 DIAGNOSIS — Z98.890 OTHER SPECIFIED POSTPROCEDURAL STATES: Chronic | ICD-10-CM

## 2020-10-02 LAB
ABO RH CONFIRMATION: SIGNIFICANT CHANGE UP
ANION GAP SERPL CALC-SCNC: 14 MMOL/L — SIGNIFICANT CHANGE UP (ref 5–17)
BLD GP AB SCN SERPL QL: SIGNIFICANT CHANGE UP
BUN SERPL-MCNC: 17 MG/DL — SIGNIFICANT CHANGE UP (ref 8–20)
CALCIUM SERPL-MCNC: 9.4 MG/DL — SIGNIFICANT CHANGE UP (ref 8.6–10.2)
CHLORIDE SERPL-SCNC: 103 MMOL/L — SIGNIFICANT CHANGE UP (ref 98–107)
CO2 SERPL-SCNC: 22 MMOL/L — SIGNIFICANT CHANGE UP (ref 22–29)
CREAT SERPL-MCNC: 0.88 MG/DL — SIGNIFICANT CHANGE UP (ref 0.5–1.3)
GLUCOSE SERPL-MCNC: 146 MG/DL — HIGH (ref 70–99)
HCT VFR BLD CALC: 46.4 % — SIGNIFICANT CHANGE UP (ref 39–50)
HGB BLD-MCNC: 16.4 G/DL — SIGNIFICANT CHANGE UP (ref 13–17)
INR BLD: 1.23 RATIO — HIGH (ref 0.88–1.16)
MAGNESIUM SERPL-MCNC: 2 MG/DL — SIGNIFICANT CHANGE UP (ref 1.8–2.6)
MCHC RBC-ENTMCNC: 32.9 PG — SIGNIFICANT CHANGE UP (ref 27–34)
MCHC RBC-ENTMCNC: 35.3 GM/DL — SIGNIFICANT CHANGE UP (ref 32–36)
MCV RBC AUTO: 93.2 FL — SIGNIFICANT CHANGE UP (ref 80–100)
PLATELET # BLD AUTO: 218 K/UL — SIGNIFICANT CHANGE UP (ref 150–400)
POTASSIUM SERPL-MCNC: 4.3 MMOL/L — SIGNIFICANT CHANGE UP (ref 3.5–5.3)
POTASSIUM SERPL-SCNC: 4.3 MMOL/L — SIGNIFICANT CHANGE UP (ref 3.5–5.3)
PROTHROM AB SERPL-ACNC: 14.1 SEC — HIGH (ref 10.6–13.6)
RBC # BLD: 4.98 M/UL — SIGNIFICANT CHANGE UP (ref 4.2–5.8)
RBC # FLD: 12 % — SIGNIFICANT CHANGE UP (ref 10.3–14.5)
SODIUM SERPL-SCNC: 139 MMOL/L — SIGNIFICANT CHANGE UP (ref 135–145)
WBC # BLD: 4.14 K/UL — SIGNIFICANT CHANGE UP (ref 3.8–10.5)
WBC # FLD AUTO: 4.14 K/UL — SIGNIFICANT CHANGE UP (ref 3.8–10.5)

## 2020-10-02 PROCEDURE — 93656 COMPRE EP EVAL ABLTJ ATR FIB: CPT

## 2020-10-02 PROCEDURE — 93320 DOPPLER ECHO COMPLETE: CPT | Mod: 26

## 2020-10-02 PROCEDURE — 93325 DOPPLER ECHO COLOR FLOW MAPG: CPT | Mod: 26

## 2020-10-02 PROCEDURE — 93312 ECHO TRANSESOPHAGEAL: CPT | Mod: 26

## 2020-10-02 PROCEDURE — 93010 ELECTROCARDIOGRAM REPORT: CPT | Mod: 76

## 2020-10-02 PROCEDURE — 93655 ICAR CATH ABLTJ DSCRT ARRHYT: CPT

## 2020-10-02 PROCEDURE — 93657 TX L/R ATRIAL FIB ADDL: CPT

## 2020-10-02 PROCEDURE — 93662 INTRACARDIAC ECG (ICE): CPT | Mod: 26

## 2020-10-02 PROCEDURE — 93623 PRGRMD STIMJ&PACG IV RX NFS: CPT | Mod: 26

## 2020-10-02 PROCEDURE — 93613 INTRACARDIAC EPHYS 3D MAPG: CPT

## 2020-10-02 RX ORDER — ALPRAZOLAM 0.25 MG
0.25 TABLET ORAL EVERY 6 HOURS
Refills: 0 | Status: DISCONTINUED | OUTPATIENT
Start: 2020-10-02 | End: 2020-10-03

## 2020-10-02 RX ORDER — BENZOCAINE AND MENTHOL 5; 1 G/100ML; G/100ML
1 LIQUID ORAL
Refills: 0 | Status: DISCONTINUED | OUTPATIENT
Start: 2020-10-02 | End: 2020-10-03

## 2020-10-02 RX ORDER — OXYCODONE AND ACETAMINOPHEN 5; 325 MG/1; MG/1
2 TABLET ORAL EVERY 6 HOURS
Refills: 0 | Status: DISCONTINUED | OUTPATIENT
Start: 2020-10-02 | End: 2020-10-03

## 2020-10-02 RX ORDER — AMLODIPINE BESYLATE 2.5 MG/1
5 TABLET ORAL DAILY
Refills: 0 | Status: DISCONTINUED | OUTPATIENT
Start: 2020-10-02 | End: 2020-10-03

## 2020-10-02 RX ORDER — RIVAROXABAN 15 MG-20MG
20 KIT ORAL
Refills: 0 | Status: DISCONTINUED | OUTPATIENT
Start: 2020-10-02 | End: 2020-10-03

## 2020-10-02 RX ORDER — VALSARTAN 80 MG/1
160 TABLET ORAL DAILY
Refills: 0 | Status: DISCONTINUED | OUTPATIENT
Start: 2020-10-02 | End: 2020-10-03

## 2020-10-02 RX ORDER — METOPROLOL TARTRATE 50 MG
200 TABLET ORAL DAILY
Refills: 0 | Status: DISCONTINUED | OUTPATIENT
Start: 2020-10-02 | End: 2020-10-03

## 2020-10-02 RX ORDER — METOPROLOL TARTRATE 50 MG
1 TABLET ORAL
Qty: 0 | Refills: 0 | DISCHARGE

## 2020-10-02 RX ORDER — FUROSEMIDE 40 MG
20 TABLET ORAL ONCE
Refills: 0 | Status: COMPLETED | OUTPATIENT
Start: 2020-10-02 | End: 2020-10-02

## 2020-10-02 RX ORDER — SUCRALFATE 1 G
1 TABLET ORAL
Refills: 0 | Status: DISCONTINUED | OUTPATIENT
Start: 2020-10-02 | End: 2020-10-03

## 2020-10-02 RX ORDER — PANTOPRAZOLE SODIUM 20 MG/1
40 TABLET, DELAYED RELEASE ORAL
Refills: 0 | Status: DISCONTINUED | OUTPATIENT
Start: 2020-10-02 | End: 2020-10-03

## 2020-10-02 RX ORDER — ACETAMINOPHEN 500 MG
650 TABLET ORAL EVERY 6 HOURS
Refills: 0 | Status: DISCONTINUED | OUTPATIENT
Start: 2020-10-02 | End: 2020-10-03

## 2020-10-02 RX ADMIN — Medication 1 GRAM(S): at 18:19

## 2020-10-02 RX ADMIN — OXYCODONE AND ACETAMINOPHEN 2 TABLET(S): 5; 325 TABLET ORAL at 18:05

## 2020-10-02 RX ADMIN — OXYCODONE AND ACETAMINOPHEN 2 TABLET(S): 5; 325 TABLET ORAL at 15:47

## 2020-10-02 RX ADMIN — RIVAROXABAN 20 MILLIGRAM(S): KIT at 19:38

## 2020-10-02 RX ADMIN — Medication 20 MILLIGRAM(S): at 18:18

## 2020-10-02 RX ADMIN — PANTOPRAZOLE SODIUM 40 MILLIGRAM(S): 20 TABLET, DELAYED RELEASE ORAL at 18:18

## 2020-10-02 NOTE — H&P PST ADULT - NSICDXPASTSURGICALHX_GEN_ALL_CORE_FT
PAST SURGICAL HISTORY:  H/O prior ablation treatment cardiac ablation  for  af  april 2018  ssh   lasted  for  3  months  as per pt    S/P hernia repair umbilical-

## 2020-10-02 NOTE — H&P PST ADULT - NSICDXPASTMEDICALHX_GEN_ALL_CORE_FT
PAST MEDICAL HISTORY:  Alcohol use excessive    Atrial fibrillation, persistent CHADsVASC 2    Cardiomyopathy Ef 45%    Dizziness     EDMOND (dyspnea on exertion)     ETOH abuse stopped daily drinking of  5-6  drinks of rum and coke  april 2018,  now  only  drinks  wine  1  glass  4  times weekly as per pt, denies  history of DTs    Fatigue     History of cardioversion 2017 x3 months of SR, 2018    Hyperlipidemia     Hypertension     LV dysfunction     Palpitations     Systolic left-sided congestive heart failure, NYHA class 1

## 2020-10-02 NOTE — ASU PATIENT PROFILE, ADULT - PMH
Alcohol use  excessive  Atrial fibrillation, persistent  CHADsVASC 2  Cardiomyopathy  Ef 45%  Dizziness    EDMOND (dyspnea on exertion)    ETOH abuse  stopped daily drinking of  5-6  drinks of rum and coke  april 2018,  now  only  drinks  wine  1  glass  4  times weekly as per pt, denies  history of DTs  Fatigue    History of cardioversion  2017 x3 months of SR, 2018  Hyperlipidemia    Hypertension    LV dysfunction    Palpitations    Systolic left-sided congestive heart failure, NYHA class 1

## 2020-10-02 NOTE — DISCHARGE NOTE PROVIDER - NSDCMRMEDTOKEN_GEN_ALL_CORE_FT
amLODIPine-valsartan 5 mg-160 mg oral tablet: 1 tab(s) orally once a day  metoprolol: 300  orally  Metoprolol Succinate  mg oral tablet, extended release: 1 tab(s) orally once a day  Xarelto 20 mg oral tablet: 1 tab(s) orally once a day (in the evening)   amLODIPine-valsartan 5 mg-160 mg oral tablet: 1 tab(s) orally once a day  furosemide 20 mg oral tablet: 1 tab(s) orally once for 3 days then STOP   Metoprolol Succinate  mg oral tablet, extended release: 1 tab(s) orally once a day  pantoprazole 40 mg oral delayed release tablet: 1 tab(s) orally 2 times a day for 14 days then once daily for 6 weeks then STOP   sucralfate 1 g/10 mL oral suspension: 10 milliliter(s) orally 2 times a day for 14 days then STOP   Xarelto 20 mg oral tablet: 1 tab(s) orally once a day (in the evening)

## 2020-10-02 NOTE — DISCHARGE NOTE PROVIDER - CARE PROVIDERS DIRECT ADDRESSES
[email or web address]

## 2020-10-02 NOTE — H&P PST ADULT - ATTENDING COMMENTS
I have personally seen, examined, and participated in the care of this patient. I have reviewed all pertinent clinical information, including history, physical exam, plan, and the PA/NP's note and agree except as noted below.    49 year old male with hypertension, former alcohol use and non-ischemic cardiomyopathy and CHF (LVEF: 40-45%) who has had recurrent persistent atrial fibrillation now presenting for catheter ablation. He was first diagnosed with atrial fibrillation in 2017. He underwent a cardioversion with recurrence of AF 3 months later. He underwent KYLIE/CV again in 10/2018 for AF and LVEF by KYLIE at the time was low at 35%. He maintained sinus rhythm until 1/2020 at which time on follow up he was found to be in recurrent AF which has persisted. TTE in 3/2020 showed an LVEF of 40-45% which appears to have remained stable. In AF he has dyspnea on exertion and lower exercise tolerance, despite rate control. Given persistent symptoms in AF with rate control, options for rhythm control were discussed with either repeat cardioversion, medications or catheter ablation. Moreover, it was felt that restoration of normal rhythm would benefit the patient given his mildly depressed LVEF. After review of all options, and all risks, benefits and alternatives, the patient elected to pursue catheter ablation.    Oswaldo Case MD  Clinical Cardiac Electrophysiology

## 2020-10-02 NOTE — ASU PATIENT PROFILE, ADULT - PSH
H/O prior ablation treatment  cardiac ablation  for  af  april 2018  ssh   lasted  for  3  months  as per pt  S/P hernia repair  umbilical-

## 2020-10-02 NOTE — H&P PST ADULT - HISTORY OF PRESENT ILLNESS
49 year old male with non-ischemic cardiomyopathy and HFmrEF (LVEF: 40-45%), hypertension and atrial fibrillation. He was first diagnosed with atrial fibrillation in 2017, when he presented to his PCP with dyspnea on exertion and ECG demonstrated AF. He had a cardioversion in 2017 but had recurrence of AF 3 months later. He saw Dr. Doyle in 10/2018 for persistent AF. His EF was noted to be 45% around that time. He underwent repeat KYLIE/CV with Dr. Doyle in 10/2018 and on KYLIE he was noted to have a low EF of 35%. His low EF was suspected to be secondary to EtOH use. Since then he has cut down on EtOH use but still drinks occasionally. He maintained NSR until 1/2020 at which time during follow up he was noted to be in AF.    When in atrial fibrillation he reports having dyspnea on exertion and not having the same exercise tolerance. He denies any fevers, chills, cough, sweats, chest pain, palpitations, orthopnea, paroxysmal nocturnal dyspnea, peripheral edema, lightheadedness, dizziness, syncope, nausea, vomiting, melena, hematochezia, or hematemesis. He presents for elective KYLIE/DCCV.      Cardiology Summary  Stress Test:  9/12/2017 Exercise MIBI: 99% MPHR. Stage IV Madi. Normal perfusion. LVEF: 54%. LV mildly dilated  Echo:    TTE 3/2/2020: LAd: 4.1 cm. LVEF: 40-45%. LA/RV/RA normal. Trace MR.    TTE 1/23/2019: LAd: 4.1 cm. LVEF: 40-45%. LA/RA/RV normal.    KYLIE 10/15/2018: LVEF: 35%. Moderately enlarged LA. RA normal. Mild MR. Trivial TR. No LA/ROCIO thrombus.    24 HR Holter 1/21/2020: Atrial fibrillation, average rate 93 bpm ( bpm). No significant pauses. 1 patient event correlating to AF.   49 year old male with non-ischemic cardiomyopathy and HFmrEF (LVEF: 40-45%), hypertension and atrial fibrillation. He was first diagnosed with atrial fibrillation in 2017, when he presented to his PCP with dyspnea on exertion and ECG demonstrated AF. He had a cardioversion in 2017 but had recurrence of AF 3 months later. His EF was subsequently noted to be 45% in 2018 and he underwent repeat KYLIE/CV with Dr. Doyle in 10/2018. He maintained NSR until 1/2020 at which time during follow up he was again noted to be in AF. He is maintained on Xarelto, which he has been tolerating without complication, as well as GDMT with metoprolol and valsartan (in combination with amlodipine).     When in atrial fibrillation he reports having dyspnea on exertion and not having the same exercise tolerance. He denies any fevers, chills, cough, sweats, chest pain, palpitations, orthopnea, paroxysmal nocturnal dyspnea, peripheral edema, lightheadedness, dizziness, syncope, nausea, vomiting, melena, hematochezia, or hematemesis. He presents for elective KYLIE/AF ablation.        Cardiology Summary  Stress Test:  9/12/2017 Exercise MIBI: 99% MPHR. Stage IV Madi. Normal perfusion. LVEF: 54%. LV mildly dilated  Echo:    TTE 3/2/2020: LAd: 4.1 cm. LVEF: 40-45%. LA/RV/RA normal. Trace MR.    TTE 1/23/2019: LAd: 4.1 cm. LVEF: 40-45%. LA/RA/RV normal.    KYLIE 10/15/2018: LVEF: 35%. Moderately enlarged LA. RA normal. Mild MR. Trivial TR. No LA/ROCIO thrombus.    24 HR Holter 1/21/2020: Atrial fibrillation, average rate 93 bpm ( bpm). No significant pauses. 1 patient event correlating to AF.

## 2020-10-02 NOTE — PROGRESS NOTE ADULT - SUBJECTIVE AND OBJECTIVE BOX
PROCEDURE(S): Radiofrequency Ablation of Atrial Fibrillation    ELECTROPHYSIOLOGIST(S): Oswaldo Case MD         COMPLICATIONS:  none        DISPOSITION: observation      CONDITION: stable    Pt doing well s/p elective radiofrequency atrial fibrillation ablation ([facility] PVI, CTI) via b/l femoral vein access.  Vascade closure device attempted b/l veins with failure on right resulting in figure 8 suture.  Pt denies complaint post procedure.     I/O's: 3552cc/0cc    MEDICATIONS  (STANDING):  amLODIPine   Tablet 5 milliGRAM(s) Oral daily  furosemide   Injectable 20 milliGRAM(s) IV Push once  metoprolol succinate  milliGRAM(s) Oral daily  pantoprazole    Tablet 40 milliGRAM(s) Oral two times a day  rivaroxaban 20 milliGRAM(s) Oral with dinner  sucralfate suspension 1 Gram(s) Oral two times a day  valsartan 160 milliGRAM(s) Oral daily    MEDICATIONS  (PRN):  acetaminophen   Tablet .. 650 milliGRAM(s) Oral every 6 hours PRN Mild Pain (1 - 3)  ALPRAZolam 0.25 milliGRAM(s) Oral every 6 hours PRN anxiety/insomnia  aluminum hydroxide/magnesium hydroxide/simethicone Suspension 30 milliLiter(s) Oral every 4 hours PRN Dyspepsia  benzocaine 15 mG/menthol 3.6 mG (Sugar-Free) Lozenge 1 Lozenge Oral every 2 hours PRN Sore Throat  oxycodone    5 mG/acetaminophen 325 mG 2 Tablet(s) Oral every 6 hours PRN Moderate Pain (4 - 6)    Allergies:  No Known Drug Allergies  shellfish (Swelling)    VS:   T(C): 36.7 (10-02-20 @ 08:10), Max: 36.7 (10-02-20 @ 08:10)  HR: 66 (10-02-20 @ 14:45) (66 - 98)  BP: 106/69 (10-02-20 @ 14:35) (106/69 - 127/90)  RR: 16 (10-02-20 @ 14:45) (16 - 16)  SpO2: 99% (10-02-20 @ 14:45) (98% - 99%)  Post-procedure VS: /53 HR 69 O2 sat 98% RR 16    VSS, NAD, A&O x 3  Card: S1/S2, RRR, no m/g/r  Resp: lungs CTA b/l  Abd: S/NT/ND  Groins: (right) hemostatic suture in place (L) vascade closure device; sites C/D/I; no bleeding, hematoma, erythema, exudate or edema  Ext: no edema; distal pulses intact    KYLIE:   Summary:   1. Left ventricular ejection fraction, by visual estimation, is 25 to 30%.   2. Moderately enlarged right ventricle.   3. Severely reduced RV systolic function.   4. Mildly enlarged left atrium.   5. No left atrial appendage thrombus.   6. Small patent foramen ovale, with predominantly right to left shunting across the atrial septum, appreciated with color flow Doppler and use of intravenous injection of agitated saline.  7. Structurally normal mitral valve, with normal leaflet excursion.   8. Normal trileaflet aortic valve with normal opening.   9. Sclerotic aortic valve with normal opening.  10. Structurally normal pulmonic valve, with normal leaflet excursion.  Hayley Marroquin D.O., Electronically signed on 10/2/2020 at 11:06:51 AM  *** Final ***    ECG: NSR 70 bpm, T wave inversion II, III aVF & flattening in lateral leads    Assessment:   49 year old male with non-ischemic cardiomyopathy and HFmrEF (LVEF: 40-45%) maintained on metoprolol and valsartan (in combination with amlodipine), hypertension and symptomatic persistent atrial fibrillation.  He was first diagnosed with atrial fibrillation in 2017.  He underwent a cardioversion with recurrence of AF 3 months later.  He underwent KYLIE/CV again in 10/2018 for AF and LVEF by KYLIE at the time was low at 35%.  He maintained sinus rhythm until 1/2020 at which time on follow up he was found to be in recurrent AF which has persisted.  Now status post uncomplicated radiofrequency ablation of atrial fibrillation (PVI, CTI) via b/l femoral vein access.  Resting comfortably.      Plan:   Bedrest x 4 hours, then OOB with assistance and progress as tolerated.   Groin sutures to be removed by EP service in AM.   Radial art line to be removed once pt fully awake with stable vitals >1 hour.    Pending groin status: resume Xarelto @ 18:00  GDMT: Toprol XL 200mg PO daily and amlodipine 5mg/valsartan 160mg PO daily   Continue other home medications.   Start Protonix 40mg BID x 2 weeks then daily X 6 weeks.     Carafate 1gm BID x 2 weeks.   Lasix 20mg IV X 1 @ 18:00   Strict I/Os.  Please encourage incentive spirometry and ambulation once able.  Observation and monitoring on telemetry overnight with anticipated discharge in the AM and outpt follow up in 1 month.

## 2020-10-02 NOTE — DISCHARGE NOTE PROVIDER - NSDCFUADDINST_GEN_ALL_CORE_FT
Follow up with Dr. Case in 2-3 weeks.  Our office will contact you in 3-5 days to schedule this appointment. Please call [phone] with questions or concerns.

## 2020-10-02 NOTE — DISCHARGE NOTE PROVIDER - HOSPITAL COURSE
49 year old male with non-ischemic cardiomyopathy and HFmrEF (LVEF: 40-45%) maintained on metoprolol and valsartan (in combination with amlodipine), hypertension and symptomatic persistent atrial fibrillation.  He was first diagnosed with atrial fibrillation in 2017.  He underwent a cardioversion with recurrence of AF 3 months later.  He underwent KYLIE/CV again in 10/2018 for AF and LVEF by KYLIE at the time was low at 35%.  He maintained sinus rhythm until 1/2020 at which time on follow up he was found to be in recurrent AF which has persisted.  He presented electively on 10/2/2020 and underwent radiofrequency ablation of atrial fibrillation (PVI, CTI) via b/l femoral vein access.  The patient was observed overnight without event and was discharged home the following morning with a plan for outpatient follow up.

## 2020-10-02 NOTE — H&P PST ADULT - NSICDXFAMILYHX_GEN_ALL_CORE_FT
FAMILY HISTORY:  Sibling  Still living? Yes, Estimated age: 41-50  Family history of diabetes mellitus, Age at diagnosis: Age Unknown

## 2020-10-02 NOTE — DISCHARGE NOTE PROVIDER - NSDCCPCAREPLAN_GEN_ALL_CORE_FT
PRINCIPAL DISCHARGE DIAGNOSIS  Diagnosis: Atrial fibrillation  Assessment and Plan of Treatment:

## 2020-10-02 NOTE — DISCHARGE NOTE PROVIDER - NSDCCPTREATMENT_GEN_ALL_CORE_FT
PRINCIPAL PROCEDURE  Procedure: Cardiac ablation  Findings and Treatment: - Bruising at the groin, sometimes extending down the leg, and/or a small lump under the skin at the groin access site is normal and will resolve within 2 – 3 weeks.   - Occasional skipped beats or palpitations that last for a few beats are common and generally resolve within 1-2 months.   - You may walk and take stairs at a regular pace.   - Do not perform any exercise more strenuous than walking for 1 week.   - Do not strain or lift heavy objects for 1 week.  - You may shower the day after the procedure.  - Do not soak in water (such as tub baths, hot tubs, swimming, etc.) for 1 week.   - You may resume all other activities the day after the procedure.  Call your doctor if:   - you notice bleeding, redness, drainage, swelling, increased tenderness or a hot sensation around the catheter insertion site.   - your temperature is greater than 100 degrees F for more than 24 hours.  - your rapid heart rhythm returns.  - you have any questions or concerns regarding the procedure.  If significant bleeding and/or a large lump (the size of a golf ball or bigger) occurs:  - Lie flat and apply continuous direct pressure just above the puncture site for at least 10 minutes  - If the issue resolves, notify your physician immediately.    - If the bleeding cannot be controlled, please seek immediate medical attention.  If you experience increased difficulty breathing or chest pain, or if you faint or have dizzy spells, please seek immediate medical attention.

## 2020-10-02 NOTE — DISCHARGE NOTE PROVIDER - CARE PROVIDER_API CALL
Oniel Ford  INTERNAL MEDICINE  [facility] [place] [address]  Phone: [phone]  Fax: [phone]  Follow Up Time:     Oswaldo Case)  Cardiology; Internal Medicine  [address] [facility], [address]  [place] [address]  Phone: [phone]  Fax: [phone]  Follow Up Time:

## 2020-10-02 NOTE — H&P PST ADULT - ASSESSMENT
49 year old male with non-ischemic cardiomyopathy and HFmrEF (LVEF: 40-45%) maintained on metoprolol and valsartan (in combination with amolipine), hypertension and symptomatic persistent atrial fibrillation s/p 2 prior cardioversions. Now with recurrent persistent AF since 1/2020; presents for KYLIE/AF ablation.     Plan:   Last dose Xarelto last PM as instructed.   Pt NPO.   Consent w/ Dr. Case.   D/C teaching initiated.

## 2020-10-02 NOTE — PROGRESS NOTE ADULT - SUBJECTIVE AND OBJECTIVE BOX
Admission Criteria  Please admit the patient to the following service: CARDIOLOGY    Major Criteria:  - Continuous EKG monitoring is required for condition causing arrhythmia (hyperkalemia, etc) s/p catheter ablation  - Significant volume load > 200 ml    Admit to: 3GUL     Patient is being admitted to the inpatient service due to high risk characteristics and need for further management/monitoring and is considered to be at a significantly increased risk of major adverse cardiac and vascular events if discharged.

## 2020-10-03 ENCOUNTER — TRANSCRIPTION ENCOUNTER (OUTPATIENT)
Age: 50
End: 2020-10-03

## 2020-10-03 VITALS — OXYGEN SATURATION: 96 % | RESPIRATION RATE: 18 BRPM | HEART RATE: 81 BPM

## 2020-10-03 LAB
ANION GAP SERPL CALC-SCNC: 12 MMOL/L — SIGNIFICANT CHANGE UP (ref 5–17)
BUN SERPL-MCNC: 15 MG/DL — SIGNIFICANT CHANGE UP (ref 8–20)
CALCIUM SERPL-MCNC: 8.1 MG/DL — LOW (ref 8.6–10.2)
CHLORIDE SERPL-SCNC: 101 MMOL/L — SIGNIFICANT CHANGE UP (ref 98–107)
CO2 SERPL-SCNC: 24 MMOL/L — SIGNIFICANT CHANGE UP (ref 22–29)
CREAT SERPL-MCNC: 0.95 MG/DL — SIGNIFICANT CHANGE UP (ref 0.5–1.3)
GLUCOSE SERPL-MCNC: 139 MG/DL — HIGH (ref 70–99)
HCT VFR BLD CALC: 40.6 % — SIGNIFICANT CHANGE UP (ref 39–50)
HGB BLD-MCNC: 13.7 G/DL — SIGNIFICANT CHANGE UP (ref 13–17)
MAGNESIUM SERPL-MCNC: 1.8 MG/DL — SIGNIFICANT CHANGE UP (ref 1.6–2.6)
MCHC RBC-ENTMCNC: 32.7 PG — SIGNIFICANT CHANGE UP (ref 27–34)
MCHC RBC-ENTMCNC: 33.7 GM/DL — SIGNIFICANT CHANGE UP (ref 32–36)
MCV RBC AUTO: 96.9 FL — SIGNIFICANT CHANGE UP (ref 80–100)
PLATELET # BLD AUTO: 168 K/UL — SIGNIFICANT CHANGE UP (ref 150–400)
POTASSIUM SERPL-MCNC: 3.7 MMOL/L — SIGNIFICANT CHANGE UP (ref 3.5–5.3)
POTASSIUM SERPL-SCNC: 3.7 MMOL/L — SIGNIFICANT CHANGE UP (ref 3.5–5.3)
RBC # BLD: 4.19 M/UL — LOW (ref 4.2–5.8)
RBC # FLD: 12.2 % — SIGNIFICANT CHANGE UP (ref 10.3–14.5)
SODIUM SERPL-SCNC: 137 MMOL/L — SIGNIFICANT CHANGE UP (ref 135–145)
WBC # BLD: 6.39 K/UL — SIGNIFICANT CHANGE UP (ref 3.8–10.5)
WBC # FLD AUTO: 6.39 K/UL — SIGNIFICANT CHANGE UP (ref 3.8–10.5)

## 2020-10-03 PROCEDURE — 93655 ICAR CATH ABLTJ DSCRT ARRHYT: CPT

## 2020-10-03 PROCEDURE — 85027 COMPLETE CBC AUTOMATED: CPT

## 2020-10-03 PROCEDURE — 93613 INTRACARDIAC EPHYS 3D MAPG: CPT

## 2020-10-03 PROCEDURE — 80048 BASIC METABOLIC PNL TOTAL CA: CPT

## 2020-10-03 PROCEDURE — 86850 RBC ANTIBODY SCREEN: CPT

## 2020-10-03 PROCEDURE — 85610 PROTHROMBIN TIME: CPT

## 2020-10-03 PROCEDURE — 86901 BLOOD TYPING SEROLOGIC RH(D): CPT

## 2020-10-03 PROCEDURE — 93662 INTRACARDIAC ECG (ICE): CPT

## 2020-10-03 PROCEDURE — 93656 COMPRE EP EVAL ABLTJ ATR FIB: CPT

## 2020-10-03 PROCEDURE — C1766: CPT

## 2020-10-03 PROCEDURE — 93623 PRGRMD STIMJ&PACG IV RX NFS: CPT

## 2020-10-03 PROCEDURE — 93657 TX L/R ATRIAL FIB ADDL: CPT

## 2020-10-03 PROCEDURE — 93312 ECHO TRANSESOPHAGEAL: CPT

## 2020-10-03 PROCEDURE — 83735 ASSAY OF MAGNESIUM: CPT

## 2020-10-03 PROCEDURE — 93320 DOPPLER ECHO COMPLETE: CPT

## 2020-10-03 PROCEDURE — 93005 ELECTROCARDIOGRAM TRACING: CPT

## 2020-10-03 PROCEDURE — 36415 COLL VENOUS BLD VENIPUNCTURE: CPT

## 2020-10-03 PROCEDURE — C1731: CPT

## 2020-10-03 PROCEDURE — 86923 COMPATIBILITY TEST ELECTRIC: CPT

## 2020-10-03 PROCEDURE — 93325 DOPPLER ECHO COLOR FLOW MAPG: CPT

## 2020-10-03 PROCEDURE — C1894: CPT

## 2020-10-03 PROCEDURE — 86900 BLOOD TYPING SEROLOGIC ABO: CPT

## 2020-10-03 PROCEDURE — 93010 ELECTROCARDIOGRAM REPORT: CPT

## 2020-10-03 PROCEDURE — C1889: CPT

## 2020-10-03 RX ORDER — FUROSEMIDE 40 MG
1 TABLET ORAL
Qty: 3 | Refills: 0
Start: 2020-10-03 | End: 2020-10-05

## 2020-10-03 RX ORDER — FUROSEMIDE 40 MG
20 TABLET ORAL ONCE
Refills: 0 | Status: COMPLETED | OUTPATIENT
Start: 2020-10-03 | End: 2020-10-03

## 2020-10-03 RX ORDER — POTASSIUM CHLORIDE 20 MEQ
40 PACKET (EA) ORAL ONCE
Refills: 0 | Status: COMPLETED | OUTPATIENT
Start: 2020-10-03 | End: 2020-10-03

## 2020-10-03 RX ORDER — SUCRALFATE 1 G
10 TABLET ORAL
Qty: 280 | Refills: 0
Start: 2020-10-03 | End: 2020-10-16

## 2020-10-03 RX ORDER — PANTOPRAZOLE SODIUM 20 MG/1
1 TABLET, DELAYED RELEASE ORAL
Qty: 70 | Refills: 0
Start: 2020-10-03

## 2020-10-03 RX ORDER — METOPROLOL TARTRATE 50 MG
300 TABLET ORAL
Qty: 0 | Refills: 0 | DISCHARGE

## 2020-10-03 RX ORDER — MAGNESIUM SULFATE 500 MG/ML
2 VIAL (ML) INJECTION ONCE
Refills: 0 | Status: COMPLETED | OUTPATIENT
Start: 2020-10-03 | End: 2020-10-03

## 2020-10-03 RX ADMIN — Medication 40 MILLIEQUIVALENT(S): at 08:12

## 2020-10-03 RX ADMIN — VALSARTAN 160 MILLIGRAM(S): 80 TABLET ORAL at 06:59

## 2020-10-03 RX ADMIN — PANTOPRAZOLE SODIUM 40 MILLIGRAM(S): 20 TABLET, DELAYED RELEASE ORAL at 06:59

## 2020-10-03 RX ADMIN — AMLODIPINE BESYLATE 5 MILLIGRAM(S): 2.5 TABLET ORAL at 06:59

## 2020-10-03 RX ADMIN — Medication 1 GRAM(S): at 06:58

## 2020-10-03 RX ADMIN — Medication 200 MILLIGRAM(S): at 06:59

## 2020-10-03 RX ADMIN — Medication 50 GRAM(S): at 08:12

## 2020-10-03 RX ADMIN — Medication 20 MILLIGRAM(S): at 08:12

## 2020-10-03 NOTE — PROGRESS NOTE ADULT - SUBJECTIVE AND OBJECTIVE BOX
Pt doing well POD #1 s/p elective radiofrequency atrial fibrillation ablation ([facility]/PVI, CTI).  No overnight events noted, pt denies complaint.  Right groin suture removed without difficulty, pt tolerated well.      EKG: NSR 61 bpm, T wave inversion II, III, aVF consistent w/prior EKG  TELE: NSR no events noted     PAST MEDICAL & SURGICAL HISTORY:  Dizziness  History of cardioversion 2017 x3 months of SR, 2018  Fatigue  Hx of ETOH abuse   Palpitations  EDMOND (dyspnea on exertion)  Systolic left-sided congestive heart failure, NYHA class 1  LV dysfunction Cardiomyopathy reduced EF  Atrial fibrillation, persistent CHADsVASC 2  Hyperlipidemia  Hypertension  H/O prior AF cardiac ablation April 2018    S/P hernia repair umbilical-    MEDICATIONS  (STANDING):  amLODIPine   Tablet 5 milliGRAM(s) Oral daily  furosemide    Tablet 20 milliGRAM(s) Oral once  magnesium sulfate  IVPB 2 Gram(s) IV Intermittent once  metoprolol succinate  milliGRAM(s) Oral daily  pantoprazole    Tablet 40 milliGRAM(s) Oral two times a day  potassium chloride    Tablet ER 40 milliEquivalent(s) Oral once  rivaroxaban 20 milliGRAM(s) Oral with dinner  sucralfate suspension 1 Gram(s) Oral two times a day  valsartan 160 milliGRAM(s) Oral daily    MEDICATIONS  (PRN):  acetaminophen   Tablet .. 650 milliGRAM(s) Oral every 6 hours PRN Mild Pain (1 - 3)  ALPRAZolam 0.25 milliGRAM(s) Oral every 6 hours PRN anxiety/insomnia  aluminum hydroxide/magnesium hydroxide/simethicone Suspension 30 milliLiter(s) Oral every 4 hours PRN Dyspepsia  benzocaine 15 mG/menthol 3.6 mG (Sugar-Free) Lozenge 1 Lozenge Oral every 2 hours PRN Sore Throat  oxycodone    5 mG/acetaminophen 325 mG 2 Tablet(s) Oral every 6 hours PRN Moderate Pain (4 - 6)    Allergies:  No Known Drug Allergies  shellfish (Swelling)    Vital Signs Last 24 Hrs  T(C): 36.4 (03 Oct 2020 06:50), Max: 36.7 (02 Oct 2020 08:10)  T(F): 97.5 (03 Oct 2020 06:50), Max: 98.1 (02 Oct 2020 08:10)  HR: 72 (03 Oct 2020 06:50) (66 - 98)  BP: 119/- (03 Oct 2020 06:50) (103/60 - 127/90)  RR: 16 (03 Oct 2020 06:50) (16 - 20)  SpO2: 95% (03 Oct 2020 06:50) (95% - 100%)    Physical Exam:  Constitutional: NAD, AAOx3  Cardiovascular: +S1S2 RRR  Pulmonary: CTA b/l, unlabored  Abd: soft NTND +BS  Groins: C/D/I bilaterally; no bleeding, hematoma, edema  Extremities: no pedal edema, +distal pulses b/l  Neuro: non focal, ALLEN x4    LABS:                        13.7   6.39  )-----------( 168      ( 03 Oct 2020 06:51 )             40.6     10-03    137  |  101  |  15.0  ----------------------------<  139<H>  3.7   |  24.0  |  0.95    Ca    8.1<L>      03 Oct 2020 06:51  Mg     1.8     10-03      PT/INR - ( 02 Oct 2020 08:09 )   PT: 14.1 sec;   INR: 1.23 ratio      Assessment:   49 year old male with non-ischemic cardiomyopathy and HFmrEF (LVEF: 40-45%) maintained on metoprolol and valsartan (in combination with amlodipine), hypertension and symptomatic persistent atrial fibrillation.  He was first diagnosed with atrial fibrillation in 2017.  He underwent a cardioversion with recurrence of AF 3 months later.  He underwent KYLIE/CV again in 10/2018 for AF and LVEF by KYLIE at the time was low at 35%.  He maintained sinus rhythm until 1/2020 at which time on follow up he was found to be in recurrent AF which has persisted.  Now POD#1 s/p radiofrequency ablation of atrial fibrillation (PVI, CTI) via b/l femoral vein access.  Resting comfortably.  Morning labs reviewed- potassium 3.7 and mag 1.8, will replace.      Plan:   KCL 40mEq X 1 now  Mag sulfate 2gram IV X 1   Xarelto 20mg PO QPM   GDMT: Toprol XL 200mg PO daily and amlodipine 5mg/valsartan 160mg PO daily   Continue other home medications.   Start Protonix 40mg BID x 2 weeks then daily X 6 weeks.     Carafate 1gm BID x 2 weeks.   Lasix 20mg PO X 3 days   Access site care and activity limitations reviewed w/ pt.   Outpt f/up in 2-4 weeks.

## 2020-10-03 NOTE — DISCHARGE NOTE NURSING/CASE MANAGEMENT/SOCIAL WORK - PATIENT PORTAL LINK FT
You can access the FollowMyHealth Patient Portal offered by [facility] by registering at the following website: [email or web address]. By joining [facility]’s FollowMyHealth portal, you will also be able to view your health information using other applications (apps) compatible with our system.

## 2020-10-08 PROBLEM — Z98.890 OTHER SPECIFIED POSTPROCEDURAL STATES: Chronic | Status: ACTIVE | Noted: 2018-10-10

## 2020-10-27 ENCOUNTER — APPOINTMENT (OUTPATIENT)
Dept: ELECTROPHYSIOLOGY | Facility: CLINIC | Age: 50
End: 2020-10-27
Payer: COMMERCIAL

## 2020-10-27 ENCOUNTER — RX RENEWAL (OUTPATIENT)
Age: 50
End: 2020-10-27

## 2020-10-27 VITALS
HEART RATE: 80 BPM | HEIGHT: 72 IN | DIASTOLIC BLOOD PRESSURE: 89 MMHG | WEIGHT: 245 LBS | BODY MASS INDEX: 33.18 KG/M2 | SYSTOLIC BLOOD PRESSURE: 141 MMHG | OXYGEN SATURATION: 98 % | TEMPERATURE: 97.4 F

## 2020-10-27 VITALS — SYSTOLIC BLOOD PRESSURE: 118 MMHG | DIASTOLIC BLOOD PRESSURE: 80 MMHG

## 2020-10-27 PROCEDURE — 93000 ELECTROCARDIOGRAM COMPLETE: CPT

## 2020-10-27 PROCEDURE — 99072 ADDL SUPL MATRL&STAF TM PHE: CPT

## 2020-10-27 PROCEDURE — 99214 OFFICE O/P EST MOD 30 MIN: CPT

## 2020-10-27 RX ORDER — METOPROLOL SUCCINATE 100 MG/1
100 TABLET, EXTENDED RELEASE ORAL DAILY
Qty: 90 | Refills: 3 | Status: DISCONTINUED | COMMUNITY
Start: 2020-03-06 | End: 2020-10-27

## 2020-10-27 NOTE — REVIEW OF SYSTEMS
[Negative] : Heme/Lymph [Fever] : no fever [Chills] : no chills [Feeling Fatigued] : not feeling fatigued [Shortness Of Breath] : no shortness of breath [Dyspnea on exertion] : not dyspnea during exertion [Chest Pain] : no chest pain [Lower Ext Edema] : no extremity edema [Palpitations] : no palpitations [Dizziness] : no dizziness [Confusion] : no confusion was observed [Anxiety] : no anxiety [Easy Bleeding] : no tendency for easy bleeding [Easy Bruising] : no tendency for easy bruising

## 2020-10-27 NOTE — DISCUSSION/SUMMARY
[FreeTextEntry1] : 51 y/o M with pmhx HTN and persistent symptomatic AF with associated NICM (LVEF 40-45%) s/p prior DCCV and recent AF ablation (10/2/20, Dr. Case).  Doing well and reports significant symptomatic procedure since ablation with decreased EDMOND.  Maintaining SR on EKG. KYLIE at  time of ablation notable for worsening LV dysfunction, LVEF 25-30%. Remains on a/c, Metoprolol and Amlodipine/Valsartan\par \par -  Plan for TTE to reassess LVEF.  If persistent LV dysfunction, would further optimize medical therapy for NICM.  Suspect LV function has already improved in SR, however, may warrant primary ICD if LV dysfunction persists. \par -  If improvement in LV function, will likely consider ILR implant for long term monitoring of recurrent atrial tachyarrhythmias.\par - CHADS2 VASC 2 (HTN, HF) continue Xarelto\par \par EP f/up depending on TTE results.\par Hannah Bejarano PAC\par

## 2020-10-27 NOTE — END OF VISIT
[FreeTextEntry3] : I have personally seen, examined, and participated in the care of this patient. I have reviewed all pertinent clinical information, including history, physical exam, plan, and the PA/NP's note and agree except as noted below.\par \par Doing well after ablation and maintaining normal rhythm. KYLIE before ablation showed reduced LVEF of 25-30%. Will repeat TTE and if EF still depressed, will work on getting patient on maximally tolerated guideline directed medical therapy. If EF is >35%, then would plan for patient to undergo ILR implantation for long term arrhythmia monitoring. ILR implantation will not be performed right now in case patient will require ICD implantation.\par \par Oswaldo Case MD, FACC, RS\par Clinical Cardiac Electrophysiology

## 2020-10-27 NOTE — HISTORY OF PRESENT ILLNESS
[FreeTextEntry1] : FIDELINA LOWRY is a 50 year old male with non-ischemic cardiomyopathy and HFmrEF (LVEF: 40-45%) and hypertension who was referred in consultation by Dr. Ford for persistent atrial fibrillation.\par \par To summarize his history, in 2017 the patient presented to his PCP with complaints of dyspnea on exertion and not feeling right. ECG demonstrated AF. He was referred to Dr. Ford. Exercise stress test was performed which was negative for ischemia. He had a cardioversion in 2017 but had recurrence of AF 3 months later. He saw Dr. Doyle in 10/2018 for persistent AF. His EF was noted to be 45% around that time. He underwent repeat KYLIE/CV with Dr. Doyle in 10/2018 and on KYLIE he was noted to have a low EF of 35%. His low EF was suspected to be secondary to EtOH use. Since then he has cut down on EtOH use but still drinks occasionally. He maintained NSR until 1/2020 at which time during follow up he was noted to be in AF. He was symptomatic and is now s/p ablation for atrial fibrillation (PVI line, CTI line) on 10/2/20. \par \par Presents for post ablation follow-up and doing very well since procedure.  Denies CP, SOB, EDMOND, dizziness, syncope, dysphagia, fevers or chills.  Tolerating a/c without bleeding complications\par

## 2020-10-27 NOTE — PHYSICAL EXAM
[General Appearance - Well Developed] : well developed [General Appearance - Well Nourished] : well nourished [Normal Oral Mucosa] : normal oral mucosa [Normal Jugular Venous V Waves Present] : normal jugular venous V waves present [Respiration, Rhythm And Depth] : normal respiratory rhythm and effort [Heart Rate And Rhythm] : heart rate and rhythm were normal [Heart Sounds] : normal S1 and S2 [Murmurs] : no murmurs present [Edema] : no peripheral edema present [Bowel Sounds] : normal bowel sounds [Abnormal Walk] : normal gait [Nail Clubbing] : no clubbing of the fingernails [Cyanosis, Localized] : no localized cyanosis [Skin Color & Pigmentation] : normal skin color and pigmentation [] : no rash [Oriented To Time, Place, And Person] : oriented to person, place, and time [No Anxiety] : not feeling anxious

## 2020-11-18 ENCOUNTER — APPOINTMENT (OUTPATIENT)
Dept: CARDIOLOGY | Facility: CLINIC | Age: 50
End: 2020-11-18
Payer: COMMERCIAL

## 2020-11-18 PROCEDURE — 93306 TTE W/DOPPLER COMPLETE: CPT

## 2020-11-20 ENCOUNTER — NON-APPOINTMENT (OUTPATIENT)
Age: 50
End: 2020-11-20

## 2021-02-04 NOTE — ASU PATIENT PROFILE, ADULT - NSALCOHOLPROBLEMSRELYN_GEN_A_CORE_SD
Enclosed you will find the results of a community-based congnitive screen for the patient    See five paged fax    Need signature and date  
Fax was just med rec base. PCP reviewed and sent to scanning.  
no

## 2021-05-07 ENCOUNTER — APPOINTMENT (OUTPATIENT)
Dept: CARDIOLOGY | Facility: CLINIC | Age: 51
End: 2021-05-07
Payer: COMMERCIAL

## 2021-05-07 ENCOUNTER — NON-APPOINTMENT (OUTPATIENT)
Age: 51
End: 2021-05-07

## 2021-05-07 VITALS
SYSTOLIC BLOOD PRESSURE: 120 MMHG | TEMPERATURE: 97.1 F | DIASTOLIC BLOOD PRESSURE: 82 MMHG | HEIGHT: 72 IN | WEIGHT: 233 LBS | BODY MASS INDEX: 31.56 KG/M2 | HEART RATE: 70 BPM | RESPIRATION RATE: 16 BRPM

## 2021-05-07 PROCEDURE — 99213 OFFICE O/P EST LOW 20 MIN: CPT

## 2021-05-07 PROCEDURE — 93000 ELECTROCARDIOGRAM COMPLETE: CPT

## 2021-05-07 PROCEDURE — 99072 ADDL SUPL MATRL&STAF TM PHE: CPT

## 2021-05-07 RX ORDER — PANTOPRAZOLE 40 MG/1
40 TABLET, DELAYED RELEASE ORAL DAILY
Qty: 90 | Refills: 3 | Status: DISCONTINUED | COMMUNITY
Start: 2020-10-27 | End: 2021-05-07

## 2021-05-07 NOTE — REASON FOR VISIT
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  \par \par Mr. Burns reports feeling overall well and without cardiac complaints. \par \par He denies any symptoms of CP, EDMOND, SOB, palpitations, presyncope, syncope, orthopnea, edema.\par \par

## 2021-05-07 NOTE — HISTORY OF PRESENT ILLNESS
[FreeTextEntry1] : Patient underwent an ablation for atrial fibrillation (PVI line, CTI line) on 10/2/20;\par \par He has maintained NSR since this procedure and reports there have been no symptoms of palpitations, dizziness, shortness of breath;\par \par Patient is tolerating cardiac medications without negative side effects including Metoprolol Succinate 200 mg QD, Xarelto 20 mg QD, Amlodipine-Valsartan 5-160 mg QD;\par \par He is inquiring if being on long term anticoagulation is necessary.  Advised that his EP specialist (Dr. Case's) office had discussed the possibility of implanting an ILR loop recorder for long term arrhythmia monitoring.  Informed him that this could be a way of d/c anticoagulation if no recurrent a-fib found;\par \par Patient reports he has been participating in formal exercise regimen few days per week without complication.  This includes some light weight lifting as well as aerobic activity such as rowing machine and eliptical machine;\par

## 2021-05-07 NOTE — PHYSICAL EXAM
[No Acute Distress] : no acute distress [Normal Conjunctiva] : normal conjunctiva [Normal Venous Pressure] : normal venous pressure [No Carotid Bruit] : no carotid bruit [Normal S1, S2] : normal S1, S2 [No Murmur] : no murmur [No Rub] : no rub [No Gallop] : no gallop [Clear Lung Fields] : clear lung fields [Good Air Entry] : good air entry [No Respiratory Distress] : no respiratory distress  [Soft] : abdomen soft [Non Tender] : non-tender [No Masses/organomegaly] : no masses/organomegaly [Normal Gait] : normal gait [No Edema] : no edema [No Cyanosis] : no cyanosis [No Clubbing] : no clubbing [No Rash] : no rash [No Skin Lesions] : no skin lesions [Moves all extremities] : moves all extremities [No Focal Deficits] : no focal deficits [Normal Speech] : normal speech [Alert and Oriented] : alert and oriented [Normal memory] : normal memory

## 2021-05-07 NOTE — DISCUSSION/SUMMARY
[FreeTextEntry1] : 1).  Patient with history of a-fib now s/p ablation back in October 2020, remains in NSR and has been asymptomatic with no cardiac complaints.  \par \par He is to follow up with EP specialist (Dr. Case's) office to discuss need for long term anticoagulation and possible ILR loop recorder implant for long term arrhythmia monitoring.  \par \par 2).  Continue current cardiac meds as directed including anticoagulation for thromboembolic prophylaxis.\par \par 3).  Follow up with PCP (Dr. Allison) regarding routine checkups and blood work.  Forward all testing/lab work to our office. \par \par 4).  Diet and lifestyle modification discussed including low sodium, low fat and low carbohydrate weight reducing diet.  Patient is to continue to implement aerobic exercise regimen few days per week. \par \par 5).  No additional cardiac testing indicated at this time. Will consider him completing transthoracic echocardiogram and possibly a stress test sometime next fall, will discuss at follow up.\par \par 6).  Recommend patient report any untoward symptoms. \par \par 7).  Follow up with Dr. Ford in 3 to 4 months or PRN.

## 2021-05-07 NOTE — ASSESSMENT
[FreeTextEntry1] : EKG 5/7/2021:  The EKG illustrates sinus rhythm, rate of 72 bpm, no significant ST-T wave changes.  Essentially unchanged.\par \par Most recent Transthoracic Echocardiogram (11/18/2020):  The left atrium is normal in size. Global diffuse hypokinesis with an LVEF of 45%. There was no significant valvulopathy noted;\par \par Most recent Exercise Gated Nuclear Stress test (9/12/2017): Patient exercised 10 METS and developed shortness of breath that resolved with rest. The patient developed frequent PVC's and occasional PACs during exercise. The myocardial perfusion imaging was negative for ischemia with artifact. The LV function was preserved with an EF of 54%.

## 2021-06-04 ENCOUNTER — EMERGENCY (EMERGENCY)
Facility: HOSPITAL | Age: 51
LOS: 0 days | Discharge: HOME | End: 2021-06-04
Attending: EMERGENCY MEDICINE
Payer: COMMERCIAL

## 2021-06-04 VITALS
DIASTOLIC BLOOD PRESSURE: 76 MMHG | HEART RATE: 74 BPM | TEMPERATURE: 97 F | SYSTOLIC BLOOD PRESSURE: 121 MMHG | RESPIRATION RATE: 18 BRPM | OXYGEN SATURATION: 97 %

## 2021-06-04 VITALS — HEIGHT: 78 IN | WEIGHT: 235.01 LBS

## 2021-06-04 DIAGNOSIS — I48.91 UNSPECIFIED ATRIAL FIBRILLATION: ICD-10-CM

## 2021-06-04 DIAGNOSIS — Z91.013 ALLERGY TO SEAFOOD: ICD-10-CM

## 2021-06-04 DIAGNOSIS — I11.0 HYPERTENSIVE HEART DISEASE WITH HEART FAILURE: ICD-10-CM
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
## 2021-06-04 DIAGNOSIS — I42.9 CARDIOMYOPATHY, UNSPECIFIED: ICD-10-CM

## 2021-06-04 DIAGNOSIS — Z98.890 OTHER SPECIFIED POSTPROCEDURAL STATES: Chronic | ICD-10-CM

## 2021-06-04 DIAGNOSIS — Z79.899 OTHER LONG TERM (CURRENT) DRUG THERAPY: ICD-10-CM
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
## 2021-06-04 DIAGNOSIS — Z79.01 LONG TERM (CURRENT) USE OF ANTICOAGULANTS: ICD-10-CM

## 2021-06-04 DIAGNOSIS — I50.20 UNSPECIFIED SYSTOLIC (CONGESTIVE) HEART FAILURE: ICD-10-CM

## 2021-06-04 DIAGNOSIS — S91.312A LACERATION WITHOUT FOREIGN BODY, LEFT FOOT, INITIAL ENCOUNTER: ICD-10-CM

## 2021-06-04 DIAGNOSIS — E78.5 HYPERLIPIDEMIA, UNSPECIFIED: ICD-10-CM

## 2021-06-04 PROCEDURE — 73620 X-RAY EXAM OF FOOT: CPT | Mod: 26,LT

## 2021-06-04 PROCEDURE — 73620 X-RAY EXAM OF FOOT: CPT | Mod: LT

## 2021-06-04 PROCEDURE — 99283 EMERGENCY DEPT VISIT LOW MDM: CPT

## 2021-06-04 PROCEDURE — 99283 EMERGENCY DEPT VISIT LOW MDM: CPT | Mod: 25

## 2021-06-04 RX ORDER — CEPHALEXIN 500 MG
500 CAPSULE ORAL ONCE
Refills: 0 | Status: COMPLETED | OUTPATIENT
Start: 2021-06-04 | End: 2021-06-04

## 2021-06-04 RX ORDER — CEPHALEXIN 500 MG
1 CAPSULE ORAL
Qty: 28 | Refills: 0
Start: 2021-06-04 | End: 2021-06-10

## 2021-06-04 RX ADMIN — Medication 500 MILLIGRAM(S): at 12:58

## 2021-06-04 NOTE — ED ADULT TRIAGE NOTE - CHIEF COMPLAINT QUOTE
Pt comes to the ED complaining of laceration to left foot. Pt states that he stepped on a broken wine glass this morning cutting his foot. Pt to see Dr. Mclean.

## 2021-06-04 NOTE — ED STATDOCS - NSFOLLOWUPINSTRUCTIONS_ED_ALL_ED_FT
Laceration    WHAT YOU NEED TO KNOW:    A laceration is an injury to the skin and the soft tissue underneath it. Lacerations happen when you are cut or hit by something. They can happen anywhere on the body.     DISCHARGE INSTRUCTIONS:    Return to the emergency department if:     You have heavy bleeding or bleeding that does not stop after 10 minutes of holding firm, direct pressure over the wound.       Your wound opens up.     Contact your healthcare provider if:     You have a fever or chills.       Your laceration is red, warm, or swollen.      You have red streaks on your skin coming from your wound.      You have white or yellow drainage from the wound that smells bad.      You have pain that gets worse, even after treatment.       You have questions or concerns about your condition or care.     Medicines:     Prescription pain medicine may be given. Ask how to take this medicine safely.       Antibiotics help treat or prevent a bacterial infection.       Take your medicine as directed. Contact your healthcare provider if you think your medicine is not helping or if you have side effects. Tell him or her if you are allergic to any medicine. Keep a list of the medicines, vitamins, and herbs you take. Include the amounts, and when and why you take them. Bring the list or the pill bottles to follow-up visits. Carry your medicine list with you in case of an emergency.    Care for your wound as directed:     Do not get your wound wet until your healthcare provider says it is okay. Do not soak your wound in water. Do not go swimming until your healthcare provider says it is okay. Carefully wash the wound with soap and water. Gently pat the area dry or allow it to air dry.       Change your bandages when they get wet, dirty, or after washing. Apply new, clean bandages as directed. Do not apply elastic bandages or tape too tight. Do not put powders or lotions over your incision.       Apply antibiotic ointment as directed. Your healthcare provider may give you antibiotic ointment to put over your wound if you have stitches. If you have strips of tape over your incision, let them dry up and fall off on their own. If they do not fall off within 14 days, gently remove them. If you have glue over your wound, do not remove or pick at it. If your glue comes off, do not replace it with glue that you have at home.       Check your wound every day for signs of infection such as swelling, redness, or pus.     Self-care:     Apply ice on your wound for 15 to 20 minutes every hour or as directed. Use an ice pack, or put crushed ice in a plastic bag. Cover it with a towel. Ice helps prevent tissue damage and decreases swelling and pain.      Use a splint as directed. A splint will decrease movement and stress on your wound. It may help it heal faster. A splint may be used for lacerations over joints or areas of your body that bend. Ask your healthcare provider how to apply and remove a splint.       Decrease scarring of your wound by applying ointments as directed. Do not apply ointments until your healthcare provider says it is okay. You may need to wait until your wound is healed. Ask which ointment to buy and how often to use it. After your wound is healed, use sunscreen over the area when you are out in the sun. You should do this for at least 6 months to 1 year after your injury.     Follow up with your healthcare provider as directed: You may need to follow up in 24 to 48 hours to have your wound checked for infection. You will need to return in 3 to 14 days if you have stitches or staples so they can be removed. Care for your wound as directed to prevent infection and help it heal. Write down your questions so you remember to ask them during your visits.       FOLLOW UP WITH DR HA IN HIS OFFICE ON WEDNESDAY. CALL THE OFFICE TO MAKE AN APPOINTMENT. RETURN TO ER FOR ANY WORSENING SYMPTOMS OR NEW CONCERNS.

## 2021-06-04 NOTE — ED ADULT NURSE NOTE - OBJECTIVE STATEMENT
Patient presents to the ED c/o laceration to left foot. Pt reports he stepped on a wine glass this morning around 6:30am. Pt was seen at urgent care PTA, received Tdap and was sent to ED for laceration repair.

## 2021-06-04 NOTE — ED ADULT NURSE NOTE - NSIMPLEMENTINTERV_GEN_ALL_ED
Implemented All Universal Safety Interventions:  [place] to call system. Call bell, personal items and telephone within reach. Instruct patient to call for assistance. Room bathroom lighting operational. Non-slip footwear when patient is off stretcher. Physically safe environment: no spills, clutter or unnecessary equipment. Stretcher in lowest position, wheels locked, appropriate side rails in place.

## 2021-06-04 NOTE — ED STATDOCS - SKIN, MLM
skin normal color for race, warm, dry. 4.5 cm linear laceration to medial aspect left mid foot extending to plantar surface. No obvious foreign body.

## 2021-06-04 NOTE — ED STATDOCS - CARE PROVIDER_API CALL
Gray Murphy)  Plastic Surgery  [address], [address] [address]  Phone: [phone]  Fax: [phone]  Follow Up Time:

## 2021-06-04 NOTE — ED STATDOCS - PATIENT PORTAL LINK FT
You can access the FollowMyHealth Patient Portal offered by [facility] by registering at the following website: [email or web address]. By joining [email or web address]’s FollowMyHealth portal, you will also be able to view your health information using other applications (apps) compatible with our system.

## 2021-06-04 NOTE — ED STATDOCS - PROGRESS NOTE DETAILS
signed Vaishnavi Saeed PA-C Pt seen initially in intake by Dr Ridley.   50M here for left foot laceration from when he stepped on broken glass today while barefoot. signed Vaishnavi Saeed PA-C Pt seen initially in intake by Dr Ridley.   50M here for left foot laceration from when he stepped on broken glass today while barefoot. Pt sent from urgent care to meet Dr Mclean. No FB seen on xray. sutured by Dr Mclean. Tetanus up to date. abx ppx keflex. f/u on 6/9 in office. NWB crutches. return precautions given. Pt feeling well at DC, agrees with DC and plan of care. NVI to distal foot.  Well appearing.  No pain.  No significant bleeding.  Wound repaired by Dr. Mclean.   D/c home with strict return precautions and prompt outpatient f/u.

## 2021-08-16 ENCOUNTER — NON-APPOINTMENT (OUTPATIENT)
Age: 51
End: 2021-08-16

## 2021-08-16 ENCOUNTER — APPOINTMENT (OUTPATIENT)
Dept: CARDIOLOGY | Facility: CLINIC | Age: 51
End: 2021-08-16
Payer: COMMERCIAL

## 2021-08-16 VITALS
HEIGHT: 72 IN | DIASTOLIC BLOOD PRESSURE: 103 MMHG | RESPIRATION RATE: 16 BRPM | HEART RATE: 67 BPM | SYSTOLIC BLOOD PRESSURE: 138 MMHG | WEIGHT: 234 LBS | BODY MASS INDEX: 31.69 KG/M2

## 2021-08-16 DIAGNOSIS — Z98.890 OTHER SPECIFIED POSTPROCEDURAL STATES: ICD-10-CM

## 2021-08-16 DIAGNOSIS — Z86.79 OTHER SPECIFIED POSTPROCEDURAL STATES: ICD-10-CM

## 2021-08-16 PROCEDURE — 93000 ELECTROCARDIOGRAM COMPLETE: CPT

## 2021-08-16 PROCEDURE — 99214 OFFICE O/P EST MOD 30 MIN: CPT

## 2021-08-16 NOTE — REASON FOR VISIT
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 November 2, 2020 with Dr. Case;\par \par He returns to the office today for general cardiac checkup;;\par \par Overall, patient states he has been generally feeling well without chest pain, shortness of breath, palpitations or dizziness;

## 2021-08-16 NOTE — ASSESSMENT
[FreeTextEntry1] : EKG shows normal sinus rhythm at a rate of 67. There is nonspecific T-wave flattening but otherwise no acute changes;\par \par In summary the patient is a 50-year-old gentleman with cardiomyopathy and LV dysfunction with last echocardiogram November 2020 showing some improvement an LVEF in the range of 40-45%;\par \par He was recommended at one point to consider ILR implant (loop monitor) to further monitor PAF in the future;\par However, patient states he is still considering this for the future; \par \par \par Plan:\par \par Patient advised to continue current medical regimen;\par \par Reinforced low-sodium diet and home blood pressure monitoring and to notify us if no improvement;\par \par Increase H2O fluids especially in the warm summer months;\par \par Return to office within 4-5 months or p.r.n.\par \par Recommend transthoracic echocardiogram prior to next visit;;

## 2021-08-16 NOTE — HISTORY OF PRESENT ILLNESS
[FreeTextEntry1] : The patient seems to be tolerating anticoagulation with Xarelto--with the exception of a laceration after stepping on a wine glass 2 months ago and requiring multiple sutures with plastic surgery;\par \par  diastolic blood pressure appears elevated today and possibly secondary to salt loading over the weekend with his diet;\par \par He intends on vacationing with family- later this month in [place] and other sites out west;

## 2021-08-24 ENCOUNTER — RX RENEWAL (OUTPATIENT)
Age: 51
End: 2021-08-24

## 2021-11-03 ENCOUNTER — RX RENEWAL (OUTPATIENT)
Age: 51
End: 2021-11-03

## 2021-11-30 ENCOUNTER — APPOINTMENT (OUTPATIENT)
Dept: CARDIOLOGY | Facility: CLINIC | Age: 51
End: 2021-11-30
Payer: COMMERCIAL

## 2021-11-30 PROCEDURE — 93306 TTE W/DOPPLER COMPLETE: CPT

## 2021-12-21 ENCOUNTER — NON-APPOINTMENT (OUTPATIENT)
Age: 51
End: 2021-12-21

## 2021-12-21 ENCOUNTER — APPOINTMENT (OUTPATIENT)
Dept: CARDIOLOGY | Facility: CLINIC | Age: 51
End: 2021-12-21
Payer: COMMERCIAL

## 2021-12-21 VITALS
WEIGHT: 238 LBS | SYSTOLIC BLOOD PRESSURE: 138 MMHG | RESPIRATION RATE: 16 BRPM | HEIGHT: 72 IN | DIASTOLIC BLOOD PRESSURE: 100 MMHG | HEART RATE: 68 BPM | BODY MASS INDEX: 32.23 KG/M2

## 2021-12-21 DIAGNOSIS — R09.89 OTHER SPECIFIED SYMPTOMS AND SIGNS INVOLVING THE CIRCULATORY AND RESPIRATORY SYSTEMS: ICD-10-CM

## 2021-12-21 PROCEDURE — 99214 OFFICE O/P EST MOD 30 MIN: CPT

## 2021-12-21 PROCEDURE — 93000 ELECTROCARDIOGRAM COMPLETE: CPT

## 2021-12-21 NOTE — REASON FOR VISIT
[Arrhythmia/ECG Abnorrmalities] : arrhythmia/ECG abnormalities [Structural Heart and Valve Disease] : structural heart and valve disease [Hypertension] : hypertension [FreeTextEntry3] : JOSE LUIS ORTEZ [FreeTextEntry1] : The patient is a 51-year-old white male with a known history of idiopathic possibly alcohol related cardiomyopathy LV dysfunction in the past, paroxysmal-lesion was treated with cardiac A. fib ablation November 2020 (Dr PHAM), presents back to our office today for general cardiac checkup and to discuss results of recent transthoracic echo study;\par \par Patient reports that he has not been checking his home blood pressure readings and shows some elevated diastolic readings again today in the office;\par He denies chest pain but has been getting occasional exertional dyspnea. There is been no significant palpitations, dizziness or syncope;\par

## 2021-12-21 NOTE — ASSESSMENT
[FreeTextEntry1] : EKG demonstrating normal sinus rhythm at a rate of 68; nonspecific T wave changes and flattening noted otherwise no acute changes borderline LVH;\par \par In summary, this 51-year-old white male has a history of idiopathic cardiomyopathy suspected EtOH and hypertension related with LV dysfunction in the past with some improvement overall in recent transthoracic echo study with EF in the range of 45-50%;\par Recent symptoms of some exertional dyspnea of questionable significance;\par Uncontrolled diastolic hypertension noted again today;\par \par \par \par Plan:\par \par counseled patient again on importance of adhering to a low sodium diet and limiting EtOH.\par \par Recommend home blood pressure monitoring on a periodic basis and record;\par \par We'll schedule carotid duplex study sometime in the near future;\par \par Recommend nuclear stress test to rule out any significant ischemic heart disease or anginal equivalent;\par \par Return to office in 3-4 months or p.r.n.

## 2021-12-21 NOTE — HISTORY OF PRESENT ILLNESS
[FreeTextEntry1] : He remains on anticoagulation with Xarelto 20 mg p.o. daily;\par \par Patient was recommended to have implantable loop recorder (ILR), with us for states he has not made up his mind whether he wanted this;\par \par Transthoracic echocardiogram from 11/30/21 shows enlarged left ventricular dimensions mildly with mild hypokinesis and systolic ejection fraction estimated in the range of 45-50%-which is slightly improved from;\par Left atrial size appears normal. Mild enlarged right atrium. Trace PI and MR;\par \par Last nuclear stress test was 2017 and was negative for ischemia on the myocardial perfusion images;

## 2022-02-10 ENCOUNTER — APPOINTMENT (OUTPATIENT)
Dept: CARDIOLOGY | Facility: CLINIC | Age: 52
End: 2022-02-10
Payer: COMMERCIAL

## 2022-02-10 PROCEDURE — 93880 EXTRACRANIAL BILAT STUDY: CPT

## 2022-03-08 ENCOUNTER — APPOINTMENT (OUTPATIENT)
Dept: CARDIOLOGY | Facility: CLINIC | Age: 52
End: 2022-03-08
Payer: COMMERCIAL

## 2022-03-08 PROCEDURE — 93015 CV STRESS TEST SUPVJ I&R: CPT

## 2022-03-08 PROCEDURE — A9500: CPT

## 2022-03-08 PROCEDURE — 78452 HT MUSCLE IMAGE SPECT MULT: CPT

## 2022-04-29 ENCOUNTER — NON-APPOINTMENT (OUTPATIENT)
Age: 52
End: 2022-04-29

## 2022-04-29 ENCOUNTER — APPOINTMENT (OUTPATIENT)
Dept: CARDIOLOGY | Facility: CLINIC | Age: 52
End: 2022-04-29
Payer: COMMERCIAL

## 2022-04-29 VITALS
HEIGHT: 72 IN | HEART RATE: 69 BPM | RESPIRATION RATE: 16 BRPM | WEIGHT: 244 LBS | DIASTOLIC BLOOD PRESSURE: 82 MMHG | OXYGEN SATURATION: 97 % | SYSTOLIC BLOOD PRESSURE: 114 MMHG | BODY MASS INDEX: 33.05 KG/M2

## 2022-04-29 PROCEDURE — 93000 ELECTROCARDIOGRAM COMPLETE: CPT

## 2022-04-29 PROCEDURE — 99214 OFFICE O/P EST MOD 30 MIN: CPT

## 2022-05-02 ENCOUNTER — RX RENEWAL (OUTPATIENT)
Age: 52
End: 2022-05-02

## 2022-06-17 NOTE — ADDENDUM
[FreeTextEntry1] : Patient now in need of cardiac clearance regarding Vasectomy procedure scheduled with Dr. Shea at his outpatient office on July 14th.\par \par 1).  Based upon . Oniel Burns's otherwise stable cardiac pattern, there is no absolute cardiac contraindication for him to undergo the proposed urological procedure.  \par \par He may hold the Xarelto 48 hours prior to procedure and restart thereafter if you deem it safe.\par \par If I may be of additional assistance, please do not hesitate to call.

## 2022-06-17 NOTE — HISTORY OF PRESENT ILLNESS
[FreeTextEntry1] : He states he recently came back from a nice vacation in the [place] with a cruise as well to [place] with his family;\par \par Patient also underwent recent cardiovascular testing;\par \par Carotid duplex study from February 10, 2022 shows only minimal calcified plaque on the left with normal flow. And normal flow and no plaquing on the right ICA.\par \par myocardial perfusion stress test performed on March 8, 2022 showed good exercise tolerance completing 3 stages. 10 METs workload. No arrhythmias seen. Blood pressure response was normal. EKG showed no significant ST changes. Myocardial perfusion imaging showed normal perfusion with normal systolic function and normal ejection fraction at 57% ; (overall improved)

## 2022-06-17 NOTE — ASSESSMENT
[FreeTextEntry1] : EKG shows normal sinus rhythm at a rate of 69. Borderline nonspecific T-wave flattening. Baseline artifact is noted;\par \par In summary, this 51-year-old gentleman with idiopathic cardiomyopathy and prior LV dysfunction has had paroxysmal atrial ablation and underwent cardiac atrial fibrillation ablation successfully;\par recent nuclear stress test showed improved LV systolic function back to normal at 57% with normal myocardial perfusion-i.e. negative test;\par Stable cardiac pattern on current medical regimen including anticoagulant (Xarelto 20 mg daily);\par \par Plan:\par \par No additional cardiac workup indicated at this time. Continue current medical regimen;\par \par Patient report any untoward bruising or bleeding and precautions given for anticoagulant;\par \par Return to office within 5 months or p.r.n.;\par \par Regular checkups with primary care encouraged;

## 2022-06-17 NOTE — REASON FOR VISIT
[Arrhythmia/ECG Abnorrmalities] : arrhythmia/ECG abnormalities [Structural Heart and Valve Disease] : structural heart and valve disease [Hyperlipidemia] : hyperlipidemia [Hypertension] : hypertension [FreeTextEntry3] : OLIVE Allison [FreeTextEntry1] : The patient is a 51-year-old white male  who has been monitored through our office for idiopathic cardiomyopathy (possibly EtOH related), as well as paroxysmal and persistent atrial fibrillation requiring atrial fibrillation cardiac ablation in November 2020(Dr. Case); there is also a history for hypertension and hyperlipidemia;\par \par He presents back to our office today for general cardiac checkup.;\par \par \par Fortunately, patient has been doing well from a cardiac standpoint without chest pain, shortness of breath, palpitations or dizziness;

## 2022-06-22 RX ORDER — KIT FOR THE PREPARATION OF TECHNETIUM TC99M SESTAMIBI 1 MG/5ML
INJECTION, POWDER, LYOPHILIZED, FOR SOLUTION PARENTERAL
Refills: 0 | Status: COMPLETED | OUTPATIENT
Start: 2022-06-22

## 2022-06-22 RX ADMIN — KIT FOR THE PREPARATION OF TECHNETIUM TC99M SESTAMIBI 0: 1 INJECTION, POWDER, LYOPHILIZED, FOR SOLUTION PARENTERAL at 00:00

## 2022-07-07 NOTE — DISCHARGE NOTE ADULT - PROVIDER TOKENS
TOKEN:'[id number]' 59 y/o M pmhx HTN, alcohol use disorder c/o of drinking too much. he complains of nausea, headache, and tremors. last drink this morning. wel lpapearing on exam, no tremors or other signs of psychomotor agitation, no sign of etoh withdrawal, well eprfused, breahthing comfortably on Ra, abdomen soft, NT/ND. nosigns of trauma, SBIRT discused outpatinet resources for alcohol cessation and control with patinet. stable for dc

## 2022-09-07 ENCOUNTER — RX RENEWAL (OUTPATIENT)
Age: 52
End: 2022-09-07

## 2022-09-23 ENCOUNTER — NON-APPOINTMENT (OUTPATIENT)
Age: 52
End: 2022-09-23

## 2022-09-23 ENCOUNTER — APPOINTMENT (OUTPATIENT)
Dept: CARDIOLOGY | Facility: CLINIC | Age: 52
End: 2022-09-23

## 2022-09-23 VITALS
HEART RATE: 70 BPM | DIASTOLIC BLOOD PRESSURE: 90 MMHG | SYSTOLIC BLOOD PRESSURE: 138 MMHG | WEIGHT: 244 LBS | BODY MASS INDEX: 33.05 KG/M2 | HEIGHT: 72 IN | RESPIRATION RATE: 16 BRPM

## 2022-09-23 PROCEDURE — 99214 OFFICE O/P EST MOD 30 MIN: CPT | Mod: 25

## 2022-09-23 PROCEDURE — 93000 ELECTROCARDIOGRAM COMPLETE: CPT

## 2022-09-23 NOTE — REASON FOR VISIT
[FreeTextEntry1] : FIDELINA LOWRY is being seen for arrhythmia/ECG abnormalities, structural heart and valve disease, hyperlipidemia and hypertension. \par \par The patient is a 51-year-old white male  who has been monitored through our office for idiopathic cardiomyopathy (possibly EtOH related), as well as paroxysmal and persistent atrial fibrillation requiring atrial fibrillation cardiac ablation in November 2020(Dr. Case); there is also a history for hypertension and hyperlipidemia;\par \par He presents back to our office today for general cardiac checkup.;\par \par Fortunately, patient has been doing well from a cardiac standpoint without chest pain, shortness of breath, palpitations or dizziness;

## 2022-09-23 NOTE — ASSESSMENT
[FreeTextEntry1] : EKG shows normal sinus rhythm at a rate of 70. Borderline nonspecific T-wave abnormality; leftward axis; PRWP V1 to V3.\par \par In summary, this 51-year-old gentleman with idiopathic cardiomyopathy and prior LV dysfunction has had paroxysmal atrial ablation and underwent cardiac atrial fibrillation ablation successfully;\par recent nuclear stress test showed improved LV systolic function back to normal at 57% with normal myocardial perfusion-i.e. negative test;\par Stable cardiac pattern on current medical regimen including anticoagulant (Xarelto 20 mg daily);\par \par Plan:\par \par Recommend patient complete a Transthoracic Echocardiogram prior to follow up to assess overall cardiac function, valvulopathy and to assess for signs of cardiac remodelling;\par \par Continue current medical regimen;\par \par Patient report any untoward bruising or bleeding and precautions given for anticoagulant;\par \par Return to office with Dr. Ford within 6 months or p.r.n.;\par \par Regular checkups with primary care encouraged;.

## 2022-09-23 NOTE — HISTORY OF PRESENT ILLNESS
[FreeTextEntry1] : Reports he remains fairly active and participates in exercise with a  at least once per week without complications or symptoms;\par \par Patient also underwent recent cardiovascular testing;\par \par Carotid duplex study from February 10, 2022 shows only minimal calcified plaque on the left with normal flow. And normal flow and no plaquing on the right ICA;\par \par Myocardial perfusion stress test performed on March 8, 2022 showed good exercise tolerance completing 3 stages. 10 METs workload. No arrhythmias seen. Blood pressure response was normal. EKG showed no significant ST changes. Myocardial perfusion imaging showed normal perfusion with normal systolic function and normal ejection fraction at 57% ; (overall improved);\par \par Last transthoracic echocardiogram from 11/30/21 shows enlarged left ventricular dimensions mildly with mild hypokinesis and systolic ejection fraction estimated in the range of 45-50%-which is slightly improved from prior study dated 2020;\par Left atrial size appears normal. Mild enlarged right atrium. Trace PI and MR;

## 2022-11-04 ENCOUNTER — RX RENEWAL (OUTPATIENT)
Age: 52
End: 2022-11-04

## 2022-11-07 ENCOUNTER — RX RENEWAL (OUTPATIENT)
Age: 52
End: 2022-11-07

## 2023-03-01 ENCOUNTER — APPOINTMENT (OUTPATIENT)
Dept: CARDIOLOGY | Facility: CLINIC | Age: 53
End: 2023-03-01

## 2023-03-10 ENCOUNTER — APPOINTMENT (OUTPATIENT)
Dept: CARDIOLOGY | Facility: CLINIC | Age: 53
End: 2023-03-10

## 2023-03-14 ENCOUNTER — APPOINTMENT (OUTPATIENT)
Dept: CARDIOLOGY | Facility: CLINIC | Age: 53
End: 2023-03-14
Payer: COMMERCIAL

## 2023-03-14 ENCOUNTER — NON-APPOINTMENT (OUTPATIENT)
Age: 53
End: 2023-03-14

## 2023-03-14 VITALS
HEART RATE: 56 BPM | RESPIRATION RATE: 16 BRPM | DIASTOLIC BLOOD PRESSURE: 90 MMHG | HEIGHT: 72 IN | BODY MASS INDEX: 32.78 KG/M2 | WEIGHT: 242 LBS | SYSTOLIC BLOOD PRESSURE: 124 MMHG

## 2023-03-14 DIAGNOSIS — F10.10 ALCOHOL ABUSE, UNCOMPLICATED: ICD-10-CM

## 2023-03-14 PROCEDURE — 93000 ELECTROCARDIOGRAM COMPLETE: CPT

## 2023-03-14 PROCEDURE — 99214 OFFICE O/P EST MOD 30 MIN: CPT | Mod: 25

## 2023-03-14 NOTE — HISTORY OF PRESENT ILLNESS
[FreeTextEntry1] : Reports he remains fairly active and participates in exercise with a  at least once per week without complications or symptoms;\par \par Carotid duplex study from February 10, 2022 shows only minimal calcified plaque on the left with normal flow. And normal flow and no plaquing on the right ICA;\par \par Myocardial perfusion stress test performed on March 8, 2022 showed good exercise tolerance completing 3 stages. 10 METs workload. No arrhythmias seen. Blood pressure response was normal. EKG showed no significant ST changes. Myocardial perfusion imaging showed normal perfusion with normal systolic function and normal ejection fraction at 57% ; (overall improved);\par \par Last transthoracic echocardiogram from 11/30/21 shows enlarged left ventricular dimensions mildly with mild hypokinesis and systolic ejection fraction estimated in the range of 45-50%-which is slightly improved from prior study dated 2020;\par Left atrial size appears normal. Mild enlarged right atrium. Trace PI and MR;

## 2023-03-14 NOTE — ASSESSMENT
[FreeTextEntry1] : EKG shows normal sinus bradycardia at a rate of 56. Borderline nonspecific T-wave abnormality; leftward axis; PRWP V1 to V3.\par \par In summary, this 52-year-old gentleman with idiopathic cardiomyopathy and prior LV dysfunction has had paroxysmal atrial ablation and underwent cardiac atrial fibrillation ablation successfully;\par recent nuclear stress test showed improved LV systolic function back to normal at 57% with normal myocardial perfusion-i.e. negative test;\par Stable cardiac pattern on current medical regimen including anticoagulant (Xarelto 20 mg daily);\par \par Plan:\par \par Recommend patient complete a Transthoracic Echocardiogram prior to follow up to assess overall cardiac function, valvulopathy and to assess for signs of cardiac remodelling;\par \par Continue current medical regimen;\par \par Patient report any untoward bruising or bleeding and precautions given for anticoagulant;\par \par Return to office with Dr. Ford within 4 to 5 months or p.r.n.;\par \par Regular checkups with primary care encouraged;

## 2023-03-14 NOTE — REASON FOR VISIT
[FreeTextEntry1] : FIDELINA LOWRY is being seen for arrhythmia/ECG abnormalities, structural heart and valve disease, hyperlipidemia and hypertension. \par \par The patient is a 52-year-old white male  who has been monitored through our office for idiopathic cardiomyopathy (possibly EtOH related), as well as paroxysmal and persistent atrial fibrillation requiring atrial fibrillation cardiac ablation in November 2020(Dr. Case); there is also a history for hypertension and hyperlipidemia;\par \par He presents back to our office today for general cardiac checkup.  Mr. Lowry was scheduled to have an echocardiogram completed earlier this month but was never completed for whatever reason.;\par \par Fortunately, patient has been doing well from a cardiac standpoint without chest pain, shortness of breath, palpitations or dizziness;

## 2023-08-09 ENCOUNTER — APPOINTMENT (OUTPATIENT)
Dept: CARDIOLOGY | Facility: CLINIC | Age: 53
End: 2023-08-09
Payer: COMMERCIAL

## 2023-08-09 PROCEDURE — 93306 TTE W/DOPPLER COMPLETE: CPT

## 2023-08-21 ENCOUNTER — APPOINTMENT (OUTPATIENT)
Dept: CARDIOLOGY | Facility: CLINIC | Age: 53
End: 2023-08-21

## 2023-09-01 ENCOUNTER — NON-APPOINTMENT (OUTPATIENT)
Age: 53
End: 2023-09-01

## 2023-09-01 ENCOUNTER — APPOINTMENT (OUTPATIENT)
Dept: CARDIOLOGY | Facility: CLINIC | Age: 53
End: 2023-09-01
Payer: COMMERCIAL

## 2023-09-01 VITALS
RESPIRATION RATE: 16 BRPM | OXYGEN SATURATION: 96 % | BODY MASS INDEX: 32.51 KG/M2 | HEIGHT: 72 IN | SYSTOLIC BLOOD PRESSURE: 110 MMHG | HEART RATE: 62 BPM | WEIGHT: 240 LBS | DIASTOLIC BLOOD PRESSURE: 80 MMHG

## 2023-09-01 DIAGNOSIS — I48.19 OTHER PERSISTENT ATRIAL FIBRILLATION: ICD-10-CM

## 2023-09-01 PROCEDURE — 99213 OFFICE O/P EST LOW 20 MIN: CPT | Mod: 25

## 2023-09-01 PROCEDURE — 93000 ELECTROCARDIOGRAM COMPLETE: CPT

## 2023-09-01 RX ORDER — RIVAROXABAN 20 MG/1
20 TABLET, FILM COATED ORAL
Qty: 90 | Refills: 3 | Status: DISCONTINUED | COMMUNITY
Start: 2022-11-07 | End: 2023-09-01

## 2023-09-01 NOTE — HISTORY OF PRESENT ILLNESS
[FreeTextEntry1] : Reports he remains fairly active and participates in exercise with a  at least once per week without complications or symptoms;  Most recent transthoracic echocardiogram from (8/9/2023) shows normal left ventricular dimension with no regional wall motion abnormalities and systolic ejection fraction estimated in the range of 45-50%- stable from prior study dated 2021.  Left atrial size appears normal. Left and right atria normal in size and structure. There was no significant valvulopathy noted.   Carotid duplex study from February 10, 2022 shows only minimal calcified plaque on the left with normal flow. And normal flow and no plaquing on the right ICA;  Myocardial perfusion stress test performed on March 8, 2022 showed good exercise tolerance completing 3 stages. 10 METs workload. No arrhythmias seen. Blood pressure response was normal. EKG showed no significant ST changes. Myocardial perfusion imaging showed normal perfusion with normal systolic function and normal ejection fraction at 57% ; (overall improved);

## 2023-09-01 NOTE — REASON FOR VISIT
[FreeTextEntry1] : FIDELINA LOWRY is being seen for arrhythmia/ECG abnormalities, structural heart and valve disease, hyperlipidemia and hypertension.   The patient is a 52-year-old white male  who has been monitored through our office for idiopathic cardiomyopathy (possibly EtOH related), as well as paroxysmal and persistent atrial fibrillation requiring atrial fibrillation cardiac ablation in November 2020(Dr. Case); there is also a history for hypertension and hyperlipidemia;  He presents back to our office today for general cardiac checkup and to review most recent Transthoracic Echocardiogram results.   Fortunately, patient has been doing well from a cardiac standpoint without chest pain, shortness of breath, palpitations or dizziness;

## 2023-09-01 NOTE — ASSESSMENT
[FreeTextEntry1] : EKG shows normal sinus rhythm at a rate of 62. Borderline nonspecific T-wave abnormality; leftward axis; Essentially unchanged.  In summary, this 52-year-old gentleman with idiopathic cardiomyopathy and prior LV dysfunction has had paroxysmal atrial ablation and underwent cardiac atrial fibrillation ablation successfully; recent nuclear stress test showed improved LV systolic function back to normal at 57% with normal myocardial perfusion-i.e. negative test; Stable cardiac pattern on current medical regimen including anticoagulant (Xarelto 20 mg daily);  Recent transthoracic echocardiogram from (8/9/2023) shows normal left ventricular dimension with no regional wall motion abnormalities and systolic ejection fraction estimated in the range of 45-50%- stable from prior study dated 2021.  Fortunately, there are no longer wall motion abnormalities present and cardiac structure is now normal.     Plan:  Patient reassured.   Continue current medical regimen;  Patient report any untoward bruising or bleeding and precautions given for anticoagulant;  Return to office with Dr. Ford within 6 months or p.r.n.;  Regular checkups with primary care encouraged;

## 2023-09-21 ENCOUNTER — RX RENEWAL (OUTPATIENT)
Age: 53
End: 2023-09-21

## 2023-11-06 ENCOUNTER — RX RENEWAL (OUTPATIENT)
Age: 53
End: 2023-11-06

## 2024-02-23 ENCOUNTER — APPOINTMENT (OUTPATIENT)
Dept: CARDIOLOGY | Facility: CLINIC | Age: 54
End: 2024-02-23
Payer: COMMERCIAL

## 2024-02-23 ENCOUNTER — NON-APPOINTMENT (OUTPATIENT)
Age: 54
End: 2024-02-23

## 2024-02-23 VITALS
DIASTOLIC BLOOD PRESSURE: 72 MMHG | WEIGHT: 232 LBS | SYSTOLIC BLOOD PRESSURE: 118 MMHG | RESPIRATION RATE: 16 BRPM | HEIGHT: 72 IN | BODY MASS INDEX: 31.42 KG/M2 | HEART RATE: 60 BPM

## 2024-02-23 DIAGNOSIS — E78.5 HYPERLIPIDEMIA, UNSPECIFIED: ICD-10-CM

## 2024-02-23 DIAGNOSIS — Z86.79 OTHER SPECIFIED POSTPROCEDURAL STATES: ICD-10-CM

## 2024-02-23 DIAGNOSIS — I42.9 CARDIOMYOPATHY, UNSPECIFIED: ICD-10-CM

## 2024-02-23 DIAGNOSIS — I10 ESSENTIAL (PRIMARY) HYPERTENSION: ICD-10-CM

## 2024-02-23 DIAGNOSIS — Z92.89 PERSONAL HISTORY OF OTHER MEDICAL TREATMENT: ICD-10-CM

## 2024-02-23 DIAGNOSIS — R00.2 PALPITATIONS: ICD-10-CM

## 2024-02-23 DIAGNOSIS — Z98.890 OTHER SPECIFIED POSTPROCEDURAL STATES: ICD-10-CM

## 2024-02-23 DIAGNOSIS — I51.9 HEART DISEASE, UNSPECIFIED: ICD-10-CM

## 2024-02-23 PROCEDURE — 99214 OFFICE O/P EST MOD 30 MIN: CPT

## 2024-02-23 PROCEDURE — 93000 ELECTROCARDIOGRAM COMPLETE: CPT

## 2024-02-23 PROCEDURE — G2211 COMPLEX E/M VISIT ADD ON: CPT

## 2024-02-23 RX ORDER — RIVAROXABAN 20 MG/1
20 TABLET, FILM COATED ORAL
Qty: 90 | Refills: 3 | Status: ACTIVE | COMMUNITY
Start: 2019-07-12 | End: 1900-01-01

## 2024-02-23 RX ORDER — AMLODIPINE AND VALSARTAN 5; 160 MG/1; MG/1
5-160 TABLET, FILM COATED ORAL
Qty: 90 | Refills: 3 | Status: ACTIVE | COMMUNITY
Start: 2018-06-20 | End: 1900-01-01

## 2024-02-23 RX ORDER — METOPROLOL SUCCINATE 200 MG/1
200 TABLET, EXTENDED RELEASE ORAL
Qty: 90 | Refills: 3 | Status: ACTIVE | COMMUNITY
Start: 2018-09-14 | End: 1900-01-01

## 2024-08-06 ENCOUNTER — NON-APPOINTMENT (OUTPATIENT)
Age: 54
End: 2024-08-06

## 2024-08-06 ENCOUNTER — APPOINTMENT (OUTPATIENT)
Dept: CARDIOLOGY | Facility: CLINIC | Age: 54
End: 2024-08-06

## 2024-08-06 PROCEDURE — 93000 ELECTROCARDIOGRAM COMPLETE: CPT

## 2024-08-06 PROCEDURE — G2211 COMPLEX E/M VISIT ADD ON: CPT | Mod: NC

## 2024-08-06 PROCEDURE — 99214 OFFICE O/P EST MOD 30 MIN: CPT

## 2024-08-06 NOTE — ASSESSMENT
[FreeTextEntry1] : EKG shows normal sinus rhythm at a rate of 74.  Essentially within normal limits;  In summary, this 53-year-old gentleman with idiopathic cardiomyopathy and prior LV dysfunction has had paroxysmal atrial ablation and underwent cardiac atrial fibrillation ablation successfully back in 2020; He is tolerating his current multiple medical regimen including Xarelto 20 mg daily and also was recently started on metformin for recently detected diabetes for which she is now trying to follow a better nutritional weight loss and low-carb diabetic diet;  Last nuclear stress test showed improved LV systolic function back to normal at 57% with normal myocardial perfusion-i.e. negative test; Stable cardiac pattern on current medical regimen including anticoagulant (Xarelto 20 mg daily);  Last transthoracic echocardiogram from (8/9/2023) shows normal left ventricular dimension with no regional wall motion abnormalities and systolic ejection fraction estimated in the range of 45-50%- stable from prior study dated 2021.  Fortunately, there are no longer wall motion abnormalities;  Appears cardiac and hemodynamically stable on current regimen;    Plan:   Recommend transthoracic echocardiogram prior to next visit within 5 to 6 months;  Continue current medical regimen; reinforced nutritional low-carb diabetic diet and moderate physical activity and exercise;  Patient report any untoward bruising or bleeding and precautions given for anticoagulant;  Regular checkups with primary care encouraged;  Follow-up to our office within 5 to 6 months or as needed;

## 2024-08-06 NOTE — REASON FOR VISIT
[FreeTextEntry1] : FIDELINA LOWRY is being seen for arrhythmia/ECG abnormalities, structural heart and valve disease, hyperlipidemia and hypertension.   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 Case); there is also a history for hypertension and hyperlipidemia; and recently detected non-insulin diabetes;  He presents back to our office today for general cardiac checkup;  Fortunately, patient has been doing well from a cardiac standpoint without chest pain, shortness of breath, palpitations or dizziness;

## 2024-08-06 NOTE — HISTORY OF PRESENT ILLNESS
[FreeTextEntry1] : Patient states that he just recently came back from a trip to [place] along with his wife for business and pleasure and did a great deal of walking and felt well with this;   Reports he remains fairly active and participates in exercise with a  at least once per week without complications or symptoms; he states he is trying to adhere to a better low-carb diet for his recently detected diabetes and recently treated;  He remains on Xarelto 20 mg daily and has had no evidence of any bleeding or bruising on this medication;   Echocardiogram from (8/9/2023) shows normal left ventricular dimension with no regional wall motion abnormalities and systolic ejection fraction estimated in the range of 45-50%- stable from prior study dated 2021.  Left atrial size appears normal. Left and right atria normal in size and structure. There was no significant valvulopathy noted.   Carotid duplex study from February 10, 2022 shows only minimal calcified plaque on the left with normal flow. And normal flow and no plaquing on the right ICA;  Myocardial perfusion stress test performed on March 8, 2022 showed good exercise tolerance completing 3 stages. 10 METs workload. No arrhythmias seen. Blood pressure response was normal. EKG showed no significant ST changes. Myocardial perfusion imaging showed normal perfusion with normal systolic function and normal ejection fraction at 57% ; (overall improved);

## 2024-09-24 ENCOUNTER — RX RENEWAL (OUTPATIENT)
Age: 54
End: 2024-09-24

## 2025-01-09 ENCOUNTER — APPOINTMENT (OUTPATIENT)
Dept: CARDIOLOGY | Facility: CLINIC | Age: 55
End: 2025-01-09

## 2025-01-09 PROCEDURE — 93306 TTE W/DOPPLER COMPLETE: CPT

## 2025-01-20 ENCOUNTER — APPOINTMENT (OUTPATIENT)
Dept: CARDIOLOGY | Facility: CLINIC | Age: 55
End: 2025-01-20
Payer: COMMERCIAL

## 2025-01-20 VITALS
RESPIRATION RATE: 16 BRPM | HEIGHT: 72 IN | WEIGHT: 235 LBS | SYSTOLIC BLOOD PRESSURE: 120 MMHG | HEART RATE: 64 BPM | BODY MASS INDEX: 31.83 KG/M2 | DIASTOLIC BLOOD PRESSURE: 72 MMHG

## 2025-01-20 DIAGNOSIS — I51.9 HEART DISEASE, UNSPECIFIED: ICD-10-CM

## 2025-01-20 DIAGNOSIS — I42.9 CARDIOMYOPATHY, UNSPECIFIED: ICD-10-CM

## 2025-01-20 DIAGNOSIS — F10.10 ALCOHOL ABUSE, UNCOMPLICATED: ICD-10-CM

## 2025-01-20 DIAGNOSIS — R00.2 PALPITATIONS: ICD-10-CM

## 2025-01-20 DIAGNOSIS — I48.19 OTHER PERSISTENT ATRIAL FIBRILLATION: ICD-10-CM

## 2025-01-20 PROCEDURE — 93000 ELECTROCARDIOGRAM COMPLETE: CPT

## 2025-01-20 PROCEDURE — G2211 COMPLEX E/M VISIT ADD ON: CPT | Mod: NC

## 2025-01-20 PROCEDURE — 99214 OFFICE O/P EST MOD 30 MIN: CPT

## 2025-02-19 ENCOUNTER — APPOINTMENT (OUTPATIENT)
Dept: CARDIOLOGY | Facility: CLINIC | Age: 55
End: 2025-02-19
Payer: COMMERCIAL

## 2025-02-19 PROCEDURE — 93880 EXTRACRANIAL BILAT STUDY: CPT

## 2025-06-27 ENCOUNTER — APPOINTMENT (OUTPATIENT)
Dept: CARDIOLOGY | Facility: CLINIC | Age: 55
End: 2025-06-27